# Patient Record
Sex: FEMALE | Race: WHITE | NOT HISPANIC OR LATINO | Employment: FULL TIME | URBAN - NONMETROPOLITAN AREA
[De-identification: names, ages, dates, MRNs, and addresses within clinical notes are randomized per-mention and may not be internally consistent; named-entity substitution may affect disease eponyms.]

---
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
## 2022-02-18 ENCOUNTER — IMMUNIZATION (OUTPATIENT)
Dept: FAMILY MEDICINE | Facility: OTHER | Age: 22
End: 2022-02-18
Attending: FAMILY MEDICINE
Payer: COMMERCIAL

## 2022-02-18 PROCEDURE — 91305 COVID-19,PF,PFIZER (12+ YRS): CPT

## 2022-02-18 PROCEDURE — 0054A COVID-19,PF,PFIZER (12+ YRS): CPT

## 2022-05-09 ENCOUNTER — OFFICE VISIT (OUTPATIENT)
Dept: FAMILY MEDICINE | Facility: OTHER | Age: 22
End: 2022-05-09
Attending: FAMILY MEDICINE
Payer: COMMERCIAL

## 2022-05-09 ENCOUNTER — HOSPITAL ENCOUNTER (OUTPATIENT)
Dept: GENERAL RADIOLOGY | Facility: OTHER | Age: 22
Discharge: HOME | End: 2022-05-09
Attending: FAMILY MEDICINE
Payer: COMMERCIAL

## 2022-05-09 VITALS
TEMPERATURE: 98.6 F | WEIGHT: 140.8 LBS | DIASTOLIC BLOOD PRESSURE: 68 MMHG | HEART RATE: 72 BPM | SYSTOLIC BLOOD PRESSURE: 126 MMHG | RESPIRATION RATE: 16 BRPM | OXYGEN SATURATION: 98 % | BODY MASS INDEX: 22.73 KG/M2

## 2022-05-09 DIAGNOSIS — R10.84 ABDOMINAL PAIN, GENERALIZED: ICD-10-CM

## 2022-05-09 DIAGNOSIS — R10.84 ABDOMINAL PAIN, GENERALIZED: Primary | ICD-10-CM

## 2022-05-09 DIAGNOSIS — R10.2 PELVIC PAIN IN FEMALE: ICD-10-CM

## 2022-05-09 DIAGNOSIS — Z01.812 PRE-PROCEDURE LAB EXAM: ICD-10-CM

## 2022-05-09 LAB
C TRACH DNA SPEC QL PROBE+SIG AMP: NEGATIVE
HCG UR QL: NEGATIVE
N GONORRHOEA DNA SPEC QL NAA+PROBE: NEGATIVE

## 2022-05-09 PROCEDURE — 87491 CHLMYD TRACH DNA AMP PROBE: CPT | Mod: ZL | Performed by: FAMILY MEDICINE

## 2022-05-09 PROCEDURE — 99213 OFFICE O/P EST LOW 20 MIN: CPT | Performed by: FAMILY MEDICINE

## 2022-05-09 PROCEDURE — 81025 URINE PREGNANCY TEST: CPT | Mod: ZL | Performed by: FAMILY MEDICINE

## 2022-05-09 PROCEDURE — 74019 RADEX ABDOMEN 2 VIEWS: CPT

## 2022-05-09 ASSESSMENT — PATIENT HEALTH QUESTIONNAIRE - PHQ9
10. IF YOU CHECKED OFF ANY PROBLEMS, HOW DIFFICULT HAVE THESE PROBLEMS MADE IT FOR YOU TO DO YOUR WORK, TAKE CARE OF THINGS AT HOME, OR GET ALONG WITH OTHER PEOPLE: SOMEWHAT DIFFICULT
SUM OF ALL RESPONSES TO PHQ QUESTIONS 1-9: 15
SUM OF ALL RESPONSES TO PHQ QUESTIONS 1-9: 15

## 2022-05-09 ASSESSMENT — PAIN SCALES - GENERAL: PAINLEVEL: MODERATE PAIN (5)

## 2022-05-09 ASSESSMENT — ENCOUNTER SYMPTOMS: ABDOMINAL PAIN: 1

## 2022-05-09 NOTE — PROGRESS NOTES
Assessment & Plan       ICD-10-CM    1. Abdominal pain, generalized  R10.84 US Abdomen Complete     US Pelvic Complete with Transvaginal     XR Abdomen 2 Views   2. Pelvic pain in female  R10.2 US Abdomen Complete     US Pelvic Complete with Transvaginal     XR Abdomen 2 Views   3. Pre-procedure lab exam  Z01.812 Pregnancy, Urine (HCG)     CANCELED: Pregnancy, Urine (HCG)     CANCELED: Pregnancy, Urine (HCG)       1.  Differential diagnosis of her symptoms are discussed with her.  This could include dysmenorrhea, endometriosis, IBS, IBD, chronic constipation, ovarian cyst.  We will proceed with this time with obtaining x-ray, mainly to look at bowel gas pattern.  We will then have her proceed with ultrasound abdomen and pelvis.  The indication for each of these exams was discussed with her and she is in agreement with this upcoming plan.  Additional follow-up will be pending results.    [id number]}    ALIYA VALDEZ MD  [facility]    Porfirio Chavez is a 21 year old who presents for the following health issues  - digestive concerns.    For several months she's had abdomen tenderness.    She will feel pressure internally, and any external pressure makes it worse.  Eating makes her feel bloated, regardless of what it is.  Periods - cramping becoming more severe.  Went to the ER for more severe pain - this was in November.  Ever since then her symptoms have been present.  No vomiting.  BMs regular, firm  - more madina than not.   No UTI type symptoms.    Sexually active - has used [employer] 4 times in the past 5 months.   In this relationship for 5 years.      Abdominal Pain     History of Present Illness       Mental Health Follow-up:                    Today's PHQ-9         PHQ-9 Total Score: 15  PHQ-9 Q9 Thoughts of better off dead/self-harm past 2 weeks :   (P) Not at all    How difficult have these problems made it for you to do your work, take care of things at home, or get  along with other people: Somewhat difficult        Reason for visit:  Stomach tenderness/ pain with pressure  Symptom onset:  More than a month  Symptoms include:  Cramping, discomfort, tenderness in my abdominal area  Symptom intensity:  Moderate  Symptom progression:  Worsening  Had these symptoms before:  No  What makes it worse:  Eating, laying on my stoamch, having any sort of pressure  What makes it better:  Keeping any and all pressure away from my stomach and i lay flat in bed    She eats 2-3 servings of fruits and vegetables daily.She consumes 3 sweetened beverage(s) daily.She exercises with enough effort to increase her heart rate 10 to 19 minutes per day.  She exercises with enough effort to increase her heart rate 5 days per week.   She is taking medications regularly.         Current Outpatient Medications   Medication     ibuprofen (ADVIL/MOTRIN) 200 MG tablet     No current facility-administered medications for this visit.      No Known Allergies  Past Medical History:   Diagnosis Date     Acne vulgaris      Major depression 2020     Malformation of urachus     2008         Review of Systems   Gastrointestinal: Positive for abdominal pain.            Objective    /68   Pulse 72   Temp 98.6  F (37  C) (Tympanic)   Resp 16   Wt 63.9 kg (140 lb 12.8 oz)   LMP 05/03/2022   SpO2 98%   Breastfeeding No   BMI 22.73 kg/m    Body mass index is 22.73 kg/m .  Physical Exam   GENERAL: healthy, alert and no distress  ABDOMEN: non-distended, mild lower abdomen tenderness, no rebound or guarding.

## 2022-05-09 NOTE — NURSING NOTE
"Chief Complaint   Patient presents with     Abdominal Pain     Patient is here for abdominal pain that is worsening. She is no longer on birth control.     Initial /68   Pulse 72   Temp 98.6  F (37  C) (Tympanic)   Resp 16   Wt 63.9 kg (140 lb 12.8 oz)   LMP 05/03/2022   SpO2 98%   Breastfeeding No   BMI 22.73 kg/m   Estimated body mass index is 22.73 kg/m  as calculated from the following:    Height as of 11/2/21: 1.676 m (5' 6\").    Weight as of this encounter: 63.9 kg (140 lb 12.8 oz).  Medication Reconciliation: complete    Tana Medina MA       FOOD SECURITY SCREENING QUESTIONS:    The next two questions are to help us understand your food security.  If you are feeling you need any assistance in this area, we have resources available to support you today.    Hunger Vital Signs:  Within the past 12 months we worried whether our food would run out before we got money to buy more. Never  Within the past 12 months the food we bought just didn't last and we didn't have money to get more. Never  Tana Medina MA,LPN on 5/9/2022 at 11:57 AM      "

## 2022-05-10 ENCOUNTER — HOSPITAL ENCOUNTER (OUTPATIENT)
Dept: ULTRASOUND IMAGING | Facility: OTHER | Age: 22
Discharge: HOME | End: 2022-05-10
Attending: FAMILY MEDICINE
Payer: COMMERCIAL

## 2022-05-10 DIAGNOSIS — R10.84 ABDOMINAL PAIN, GENERALIZED: ICD-10-CM

## 2022-05-10 DIAGNOSIS — R10.2 PELVIC PAIN IN FEMALE: ICD-10-CM

## 2022-05-10 PROCEDURE — 76700 US EXAM ABDOM COMPLETE: CPT

## 2022-05-10 PROCEDURE — 76856 US EXAM PELVIC COMPLETE: CPT

## 2022-05-10 ASSESSMENT — PATIENT HEALTH QUESTIONNAIRE - PHQ9: SUM OF ALL RESPONSES TO PHQ QUESTIONS 1-9: 15

## 2022-05-20 ENCOUNTER — LAB (OUTPATIENT)
Dept: LAB | Facility: OTHER | Age: 22
End: 2022-05-20
Payer: COMMERCIAL

## 2022-05-20 ENCOUNTER — LAB REQUISITION (OUTPATIENT)
Dept: LAB | Facility: OTHER | Age: 22
End: 2022-05-20

## 2022-05-20 DIAGNOSIS — Z02.1 ENCOUNTER FOR PRE-EMPLOYMENT EXAMINATION: ICD-10-CM

## 2022-05-20 PROCEDURE — 86481 TB AG RESPONSE T-CELL SUSP: CPT | Mod: ZL

## 2022-05-22 LAB
GAMMA INTERFERON BACKGROUND BLD IA-ACNC: 0.01 IU/ML
M TB IFN-G BLD-IMP: NEGATIVE
M TB IFN-G CD4+ BCKGRND COR BLD-ACNC: 9.99 IU/ML
MITOGEN IGNF BCKGRD COR BLD-ACNC: 0 IU/ML
MITOGEN IGNF BCKGRD COR BLD-ACNC: 0.01 IU/ML
QUANTIFERON MITOGEN: 10 IU/ML
QUANTIFERON NIL TUBE: 0.01 IU/ML
QUANTIFERON TB1 TUBE: 0.02 IU/ML
QUANTIFERON TB2 TUBE: 0.01

## 2022-06-02 ENCOUNTER — MYC MEDICAL ADVICE (OUTPATIENT)
Dept: FAMILY MEDICINE | Facility: OTHER | Age: 22
End: 2022-06-02
Payer: COMMERCIAL

## 2022-06-06 ENCOUNTER — OFFICE VISIT (OUTPATIENT)
Dept: FAMILY MEDICINE | Facility: OTHER | Age: 22
End: 2022-06-06
Attending: NURSE PRACTITIONER
Payer: COMMERCIAL

## 2022-06-06 VITALS
SYSTOLIC BLOOD PRESSURE: 124 MMHG | HEART RATE: 78 BPM | BODY MASS INDEX: 22.76 KG/M2 | OXYGEN SATURATION: 97 % | RESPIRATION RATE: 18 BRPM | TEMPERATURE: 98.8 F | WEIGHT: 141 LBS | DIASTOLIC BLOOD PRESSURE: 66 MMHG

## 2022-06-06 DIAGNOSIS — B37.41 YEAST CYSTITIS: ICD-10-CM

## 2022-06-06 DIAGNOSIS — N30.01 ACUTE CYSTITIS WITH HEMATURIA: Primary | ICD-10-CM

## 2022-06-06 DIAGNOSIS — R39.9 UTI SYMPTOMS: ICD-10-CM

## 2022-06-06 LAB
ALBUMIN UR-MCNC: 20 MG/DL
APPEARANCE UR: ABNORMAL
BACTERIA #/AREA URNS HPF: ABNORMAL /HPF
BILIRUB UR QL STRIP: NEGATIVE
COLOR UR AUTO: YELLOW
GLUCOSE UR STRIP-MCNC: NEGATIVE MG/DL
HGB UR QL STRIP: ABNORMAL
KETONES UR STRIP-MCNC: NEGATIVE MG/DL
LEUKOCYTE ESTERASE UR QL STRIP: ABNORMAL
NITRATE UR QL: NEGATIVE
PH UR STRIP: 7 [PH] (ref 5–9)
RBC URINE: 64 /HPF
SP GR UR STRIP: 1.01 (ref 1–1.03)
SQUAMOUS EPITHELIAL: 3 /HPF
UROBILINOGEN UR STRIP-MCNC: NORMAL MG/DL
WBC CLUMPS #/AREA URNS HPF: PRESENT /HPF
WBC URINE: >182 /HPF
YEAST #/AREA URNS HPF: ABNORMAL /HPF

## 2022-06-06 PROCEDURE — 81001 URINALYSIS AUTO W/SCOPE: CPT | Mod: ZL | Performed by: NURSE PRACTITIONER

## 2022-06-06 PROCEDURE — 87186 SC STD MICRODIL/AGAR DIL: CPT | Mod: ZL | Performed by: NURSE PRACTITIONER

## 2022-06-06 PROCEDURE — 99214 OFFICE O/P EST MOD 30 MIN: CPT | Performed by: NURSE PRACTITIONER

## 2022-06-06 RX ORDER — FLUCONAZOLE 200 MG/1
200 TABLET ORAL DAILY
Qty: 10 TABLET | Refills: 0 | Status: SHIPPED | OUTPATIENT
Start: 2022-06-06 | End: 2022-06-16

## 2022-06-06 RX ORDER — NITROFURANTOIN 25; 75 MG/1; MG/1
100 CAPSULE ORAL 2 TIMES DAILY
Qty: 10 CAPSULE | Refills: 0 | Status: SHIPPED | OUTPATIENT
Start: 2022-06-06 | End: 2022-06-11

## 2022-06-06 ASSESSMENT — PAIN SCALES - GENERAL: PAINLEVEL: SEVERE PAIN (6)

## 2022-06-06 NOTE — PROGRESS NOTES
ASSESSMENT/PLAN:     I have reviewed the nursing notes.  I have reviewed the findings, diagnosis, plan and need for follow up with the patient.      1. UTI symptoms    - UA reflex to Microscopic and Culture  - Urine Culture    2. Acute cystitis with hematuria    - nitroFURantoin macrocrystal-monohydrate (MACROBID) 100 MG capsule; Take 1 capsule (100 mg) by mouth 2 times daily for 5 days  Dispense: 10 capsule; Refill: 0    Urinalysis - yellow, slightly cloudy, large blood, protein 20, negative nitrite, large leukocyte estrace  Urine microscopic - RBC 64, WBC >182 with clumps, bacteria few, budding yeast few    Urine culture pending  Will call if culture warrants change of abx.     May use Pyridium OTC PRN.     Encouraged fluids and frequent bladder emptying.    3. Yeast cystitis    - fluconazole (DIFLUCAN) 200 MG tablet; Take 1 tablet (200 mg) by mouth daily for 10 days  Dispense: 10 tablet; Refill: 0    Discussed warning signs/symptoms indicative of need to f/u  Follow up if symptoms persist or worsen or concerns          I explained my diagnostic considerations and recommendations to the patient, who voiced understanding and agreement with the treatment plan. All questions were answered. We discussed potential side effects of any prescribed or recommended therapies, as well as expectations for response to treatments.    Aby Kyle NP  [facility]      SUBJECTIVE:   Kathy Hays is a 22 year old female who presents to clinic today for the following health issues:  Possible UTI    HPI  Symptoms for the past 4 to 5 days including dysuria, frequency, urgency, cloudy urine, urine odor and post void discomfort.  Scant blood in urine just noted in clinic.  No fevers or chills.  No abdominal pain.  No nausea or vomiting.  Appetite at baseline.  No diarrhea.  Constipation for the past few months seen her PCP, taking Miralax daily.  No back pain.  No vaginal itching, discharge or bleeding.  No  pregnancy concerns.  LMP 5/15/-518.  No birth control.  Condom use.  No STD concerns, same partner.            Past Medical History:   Diagnosis Date     Acne vulgaris      Major depression      Malformation of urachus          Past Surgical History:   Procedure Laterality Date     INCISION AND DRAINAGE  2007    INCISION AND DRAINAGE,Excision of a urachus     TONSILLECTOMY, ADENOIDECTOMY, COMBINED           Social History     Tobacco Use     Smoking status: Former Smoker     Types: Vaping Device     Quit date: 2019     Years since quittin.8     Smokeless tobacco: Never Used     Tobacco comment: vaping   Substance Use Topics     Alcohol use: No     No current outpatient medications on file.     No Known Allergies      Past medical history, past surgical history, current medications and allergies reviewed and accurate to the best of my knowledge.        OBJECTIVE:     /66   Pulse 78   Temp 98.8  F (37.1  C) (Tympanic)   Resp 18   Wt 64 kg (141 lb)   LMP 2022   SpO2 97%   BMI 22.76 kg/m    Body mass index is 22.76 kg/m .     Physical Exam  General Appearance: Well appearing adolescent female, appropriate appearance for age. No acute distress  Respiratory: normal chest wall and respirations.  Normal effort.  Clear to auscultation bilaterally, no wheezing, crackles or rhonchi.  No increased work of breathing.  No cough appreciated.  Cardiac: RRR with no murmurs  Abdomen: soft, nontender, no rigidity, no rebound tenderness or guarding, normal bowel sounds present  :  No suprapubic tenderness to palpation.  No CVA tenderness to palpation.    Musculoskeletal:  Equal movement of bilateral upper extremities.  Equal movement of bilateral lower extremities.  Normal gait.   Psychological: normal affect, alert, oriented, and pleasant.       Labs:  Results for orders placed or performed in visit on 22   UA reflex to Microscopic and Culture     Status: Abnormal    Specimen:  Urine, Midstream   Result Value Ref Range    Color Urine Yellow Colorless, Straw, Light Yellow, Yellow    Appearance Urine Slightly Cloudy (A) Clear    Glucose Urine Negative Negative mg/dL    Bilirubin Urine Negative Negative    Ketones Urine Negative Negative mg/dL    Specific Gravity Urine 1.007 1.000 - 1.030    Blood Urine Large (A) Negative    pH Urine 7.0 5.0 - 9.0    Protein Albumin Urine 20  (A) Negative mg/dL    Urobilinogen Urine Normal Normal, 2.0 mg/dL    Nitrite Urine Negative Negative    Leukocyte Esterase Urine Large (A) Negative    Bacteria Urine Few (A) None Seen /HPF    WBC Clumps Urine Present (A) None Seen /HPF    Budding Yeast Urine Few (A) None Seen /HPF    RBC Urine 64 (H) <=2 /HPF    WBC Urine >182 (H) <=5 /HPF    Squamous Epithelials Urine 3 (H) <=1 /HPF    Narrative    Urine Culture ordered based on laboratory criteria

## 2022-06-06 NOTE — NURSING NOTE
"Chief Complaint   Patient presents with     Urinary Problem     Patient is here for cloudy urine with an odor to it and burning upon urination that started 4-5 days ago and worsened today.    Initial /66   Pulse 78   Temp 98.8  F (37.1  C) (Tympanic)   Resp 18   Wt 64 kg (141 lb)   LMP 05/03/2022   SpO2 97%   BMI 22.76 kg/m   Estimated body mass index is 22.76 kg/m  as calculated from the following:    Height as of 11/2/21: 1.676 m (5' 6\").    Weight as of this encounter: 64 kg (141 lb).  Medication Reconciliation: complete    Hannah Ivan LPN  "

## 2022-06-08 LAB — BACTERIA UR CULT: ABNORMAL

## 2022-11-12 ENCOUNTER — APPOINTMENT (OUTPATIENT)
Dept: ULTRASOUND IMAGING | Facility: OTHER | Age: 22
End: 2022-11-12
Attending: FAMILY MEDICINE
Payer: COMMERCIAL

## 2022-11-12 ENCOUNTER — HOSPITAL ENCOUNTER (EMERGENCY)
Facility: OTHER | Age: 22
Discharge: HOME | End: 2022-11-12
Attending: FAMILY MEDICINE | Admitting: FAMILY MEDICINE
Payer: COMMERCIAL

## 2022-11-12 VITALS
RESPIRATION RATE: 18 BRPM | OXYGEN SATURATION: 97 % | HEART RATE: 71 BPM | DIASTOLIC BLOOD PRESSURE: 65 MMHG | TEMPERATURE: 98.1 F | SYSTOLIC BLOOD PRESSURE: 110 MMHG

## 2022-11-12 DIAGNOSIS — R18.8 FREE FLUID IN PELVIS: ICD-10-CM

## 2022-11-12 DIAGNOSIS — N83.202 RUPTURED CYST OF LEFT OVARY: ICD-10-CM

## 2022-11-12 DIAGNOSIS — R10.2 PELVIC PAIN: ICD-10-CM

## 2022-11-12 LAB
ALBUMIN UR-MCNC: 30 MG/DL
ANION GAP SERPL CALCULATED.3IONS-SCNC: 10 MMOL/L (ref 7–15)
APPEARANCE UR: ABNORMAL
BASOPHILS # BLD AUTO: 0.1 10E3/UL (ref 0–0.2)
BASOPHILS NFR BLD AUTO: 1 %
BILIRUB UR QL STRIP: NEGATIVE
BUN SERPL-MCNC: 7 MG/DL (ref 6–20)
CALCIUM SERPL-MCNC: 9.1 MG/DL (ref 8.6–10)
CHLORIDE SERPL-SCNC: 104 MMOL/L (ref 98–107)
COLOR UR AUTO: YELLOW
CREAT SERPL-MCNC: 0.71 MG/DL (ref 0.51–0.95)
DEPRECATED HCO3 PLAS-SCNC: 24 MMOL/L (ref 22–29)
EOSINOPHIL # BLD AUTO: 0.1 10E3/UL (ref 0–0.7)
EOSINOPHIL NFR BLD AUTO: 1 %
ERYTHROCYTE [DISTWIDTH] IN BLOOD BY AUTOMATED COUNT: 12.5 % (ref 10–15)
GFR SERPL CREATININE-BSD FRML MDRD: >90 ML/MIN/1.73M2
GLUCOSE SERPL-MCNC: 98 MG/DL (ref 70–99)
GLUCOSE UR STRIP-MCNC: NEGATIVE MG/DL
HCG UR QL: NEGATIVE
HCT VFR BLD AUTO: 37.9 % (ref 35–47)
HGB BLD-MCNC: 13.1 G/DL (ref 11.7–15.7)
HGB UR QL STRIP: NEGATIVE
HOLD SPECIMEN: NORMAL
IMM GRANULOCYTES # BLD: 0 10E3/UL
IMM GRANULOCYTES NFR BLD: 0 %
KETONES UR STRIP-MCNC: ABNORMAL MG/DL
LEUKOCYTE ESTERASE UR QL STRIP: NEGATIVE
LYMPHOCYTES # BLD AUTO: 2.4 10E3/UL (ref 0.8–5.3)
LYMPHOCYTES NFR BLD AUTO: 26 %
MCH RBC QN AUTO: 30.5 PG (ref 26.5–33)
MCHC RBC AUTO-ENTMCNC: 34.6 G/DL (ref 31.5–36.5)
MCV RBC AUTO: 88 FL (ref 78–100)
MONOCYTES # BLD AUTO: 0.8 10E3/UL (ref 0–1.3)
MONOCYTES NFR BLD AUTO: 8 %
MUCOUS THREADS #/AREA URNS LPF: PRESENT /LPF
NEUTROPHILS # BLD AUTO: 5.8 10E3/UL (ref 1.6–8.3)
NEUTROPHILS NFR BLD AUTO: 64 %
NITRATE UR QL: NEGATIVE
NRBC # BLD AUTO: 0 10E3/UL
NRBC BLD AUTO-RTO: 0 /100
PH UR STRIP: 8 [PH] (ref 5–9)
PLATELET # BLD AUTO: 270 10E3/UL (ref 150–450)
POTASSIUM SERPL-SCNC: 3.9 MMOL/L (ref 3.4–5.3)
RBC # BLD AUTO: 4.3 10E6/UL (ref 3.8–5.2)
RBC URINE: 1 /HPF
SODIUM SERPL-SCNC: 138 MMOL/L (ref 136–145)
SP GR UR STRIP: 1.03 (ref 1–1.03)
SQUAMOUS EPITHELIAL: 1 /HPF
UROBILINOGEN UR STRIP-MCNC: 4 MG/DL
WBC # BLD AUTO: 9.2 10E3/UL (ref 4–11)
WBC URINE: 1 /HPF

## 2022-11-12 PROCEDURE — 80048 BASIC METABOLIC PNL TOTAL CA: CPT | Performed by: FAMILY MEDICINE

## 2022-11-12 PROCEDURE — 85025 COMPLETE CBC W/AUTO DIFF WBC: CPT | Performed by: FAMILY MEDICINE

## 2022-11-12 PROCEDURE — 81025 URINE PREGNANCY TEST: CPT | Performed by: FAMILY MEDICINE

## 2022-11-12 PROCEDURE — 96372 THER/PROPH/DIAG INJ SC/IM: CPT | Performed by: FAMILY MEDICINE

## 2022-11-12 PROCEDURE — 99285 EMERGENCY DEPT VISIT HI MDM: CPT | Mod: 25 | Performed by: FAMILY MEDICINE

## 2022-11-12 PROCEDURE — 81001 URINALYSIS AUTO W/SCOPE: CPT | Performed by: FAMILY MEDICINE

## 2022-11-12 PROCEDURE — 250N000011 HC RX IP 250 OP 636: Performed by: FAMILY MEDICINE

## 2022-11-12 PROCEDURE — 76856 US EXAM PELVIC COMPLETE: CPT | Mod: TC

## 2022-11-12 PROCEDURE — 36415 COLL VENOUS BLD VENIPUNCTURE: CPT | Performed by: FAMILY MEDICINE

## 2022-11-12 PROCEDURE — 99283 EMERGENCY DEPT VISIT LOW MDM: CPT | Performed by: FAMILY MEDICINE

## 2022-11-12 RX ORDER — KETOROLAC TROMETHAMINE 30 MG/ML
30 INJECTION, SOLUTION INTRAMUSCULAR; INTRAVENOUS ONCE
Status: COMPLETED | OUTPATIENT
Start: 2022-11-12 | End: 2022-11-12

## 2022-11-12 RX ADMIN — KETOROLAC TROMETHAMINE 30 MG: 30 INJECTION, SOLUTION INTRAMUSCULAR at 01:11

## 2022-11-12 ASSESSMENT — ENCOUNTER SYMPTOMS
NAUSEA: 0
VOMITING: 0
FEVER: 0

## 2022-11-12 ASSESSMENT — ACTIVITIES OF DAILY LIVING (ADL): ADLS_ACUITY_SCORE: 35

## 2022-11-12 NOTE — ED TRIAGE NOTES
Pt to ER with SO from home.  Following intercourse on Thursday pt experienced abdominal cramping, more in LLQ than rest.  Following day continued to have constant abdominal cramps, however also experienced rectal cramping severely for approx 2 hour.  Rectal cramping since subsided, only pain when tries to have BM.  Pain at this time diffuse 7/10 t/o abdomen, though centers most on LLQ. Denies abnormal vaginal drainage.     Triage Assessment       Row Name 11/12/22 0056       Triage Assessment (Adult)    Airway WDL WDL       Respiratory WDL    Respiratory WDL WDL       Skin Circulation/Temperature WDL    Skin Circulation/Temperature WDL WDL       Cardiac WDL    Cardiac WDL WDL       Peripheral/Neurovascular WDL    Peripheral Neurovascular WDL WDL       Cognitive/Neuro/Behavioral WDL    Cognitive/Neuro/Behavioral WDL WDL

## 2022-11-12 NOTE — ED PROVIDER NOTES
History     Chief Complaint   Patient presents with     Abdominal Pain     Rectal/perineal Pain     The history is provided by the patient.     Kathy Hays is a 22 year old female here with pelvic pain and rectal pain. She first had pain during intercourse on Thursday morning before going to work. She had LLQ pain and cramping. She was able to work all day but in the evening she had rectal cramps. She took a hot shower and tried some ibuprofen. The cramping pain has been persistent and today (Saturday 1:10 AM) she still has crampy pain. She did take some Tylenol this evening.  Her LMP was Oct 20 and normal for her. She is not using protection against pregnancy. No known history of symptomatic ovarian cysts.     Allergies:  No Known Allergies    Problem List:    Patient Active Problem List    Diagnosis Date Noted     Acne vulgaris      Priority: Medium     Nevus, non-neoplastic 08/27/2008     Priority: Medium     Overview:   Congenital nevus angle of left jaw, is on carotid artery, so technically difficult to remove. Signed by Lisa Winter MD .....8/16/2016 2:23 PM          Past Medical History:    Past Medical History:   Diagnosis Date     Acne vulgaris      Major depression 2020     Malformation of urachus        Past Surgical History:    Past Surgical History:   Procedure Laterality Date     INCISION AND DRAINAGE  01/04/2007    INCISION AND DRAINAGE,Excision of a urachus     TONSILLECTOMY, ADENOIDECTOMY, COMBINED      2007       Family History:    Family History   Problem Relation Age of Onset     Depression Mother      Thyroid Disease Father      Cancer Maternal Grandfather         Cancer,throat       Social History:  Marital Status:  Single [1]  Social History     Tobacco Use     Smoking status: Former     Types: Vaping Device     Quit date: 8/11/2019     Years since quitting: 3.2     Smokeless tobacco: Never     Tobacco comments:     vaping   Vaping Use     Vaping Use: Every day   Substance Use Topics      Alcohol use: No     Drug use: Never        Medications:    No current outpatient medications on file.    Review of Systems   Constitutional: Negative for fever.   Gastrointestinal: Negative for nausea and vomiting.   Genitourinary: Positive for pelvic pain.   All other systems reviewed and are negative.      Physical Exam   BP: 136/75  Pulse: 79  Temp: 98.1  F (36.7  C)  Resp: 18  SpO2: 97 %      Physical Exam  Vitals and nursing note reviewed.   Constitutional:       General: She is not in acute distress.     Appearance: She is well-developed. She is not ill-appearing, toxic-appearing or diaphoretic.   Cardiovascular:      Rate and Rhythm: Normal rate and regular rhythm.      Heart sounds: Normal heart sounds.   Pulmonary:      Effort: Pulmonary effort is normal. No respiratory distress.   Abdominal:      General: Abdomen is flat. Bowel sounds are normal.      Palpations: Abdomen is soft.      Tenderness: There is abdominal tenderness in the suprapubic area and left lower quadrant. There is no guarding or rebound.   Skin:     General: Skin is warm and dry.      Coloration: Skin is not mottled or pale.      Findings: No erythema or rash.   Neurological:      Mental Status: She is alert.         Results for orders placed or performed during the hospital encounter of 11/12/22 (from the past 24 hour(s))   UA reflex to Microscopic and Culture    Specimen: Urine, Clean Catch   Result Value Ref Range    Color Urine Yellow Colorless, Straw, Light Yellow, Yellow    Appearance Urine Slightly Cloudy (A) Clear    Glucose Urine Negative Negative mg/dL    Bilirubin Urine Negative Negative    Ketones Urine Trace (A) Negative mg/dL    Specific Gravity Urine 1.032 (H) 1.000 - 1.030    Blood Urine Negative Negative    pH Urine 8.0 5.0 - 9.0    Protein Albumin Urine 30 (A) Negative mg/dL    Urobilinogen Urine 4.0 (A) Normal, 2.0 mg/dL    Nitrite Urine Negative Negative    Leukocyte Esterase Urine Negative Negative    Mucus Urine  Present (A) None Seen /LPF    RBC Urine 1 <=2 /HPF    WBC Urine 1 <=5 /HPF    Squamous Epithelials Urine 1 <=1 /HPF    Narrative    Urine Culture not indicated   HCG qualitative urine   Result Value Ref Range    hCG Urine Qualitative Negative Negative   CBC with platelets differential    Narrative    The following orders were created for panel order CBC with platelets differential.  Procedure                               Abnormality         Status                     ---------                               -----------         ------                     CBC with platelets and d...[[id number]]                      Final result                 Please view results for these tests on the individual orders.   Basic metabolic panel   Result Value Ref Range    Sodium 138 136 - 145 mmol/L    Potassium 3.9 3.4 - 5.3 mmol/L    Chloride 104 98 - 107 mmol/L    Carbon Dioxide (CO2) 24 22 - 29 mmol/L    Anion Gap 10 7 - 15 mmol/L    Urea Nitrogen 7.0 6.0 - 20.0 mg/dL    Creatinine 0.71 0.51 - 0.95 mg/dL    Calcium 9.1 8.6 - 10.0 mg/dL    Glucose 98 70 - 99 mg/dL    GFR Estimate >90 >60 mL/min/1.73m2   CBC with platelets and differential   Result Value Ref Range    WBC Count 9.2 4.0 - 11.0 10e3/uL    RBC Count 4.30 3.80 - 5.20 10e6/uL    Hemoglobin 13.1 11.7 - 15.7 g/dL    Hematocrit 37.9 35.0 - 47.0 %    MCV 88 78 - 100 fL    MCH 30.5 26.5 - 33.0 pg    MCHC 34.6 31.5 - 36.5 g/dL    RDW 12.5 10.0 - 15.0 %    Platelet Count 270 150 - 450 10e3/uL    % Neutrophils 64 %    % Lymphocytes 26 %    % Monocytes 8 %    % Eosinophils 1 %    % Basophils 1 %    % Immature Granulocytes 0 %    NRBCs per 100 WBC 0 <1 /100    Absolute Neutrophils 5.8 1.6 - 8.3 10e3/uL    Absolute Lymphocytes 2.4 0.8 - 5.3 10e3/uL    Absolute Monocytes 0.8 0.0 - 1.3 10e3/uL    Absolute Eosinophils 0.1 0.0 - 0.7 10e3/uL    Absolute Basophils 0.1 0.0 - 0.2 10e3/uL    Absolute Immature Granulocytes 0.0 <=0.4 10e3/uL    Absolute NRBCs 0.0 10e3/uL   Extra Tube     Narrative    The following orders were created for panel order Extra Tube.  Procedure                               Abnormality         Status                     ---------                               -----------         ------                     Extra Blue Top Tube[[id number]]                              Final result               Extra Serum Separator Tu...[[id number]]                      Final result               Extra Green Top (Lithium...[[id number]]                      Final result                 Please view results for these tests on the individual orders.   Extra Blue Top Tube   Result Value Ref Range    Hold Specimen JIC    Extra Serum Separator Tube (SST)   Result Value Ref Range    Hold Specimen JIC    Extra Green Top (Lithium Heparin) ON ICE   Result Value Ref Range    Hold Specimen JIC    US Pelvic Complete w Transvaginal    Narrative    PROCEDURE INFORMATION:   Exam: US Pelvis Complete, Transabdominal and US Pelvis, Transvaginal and US   Duplex Artery and Vein, Ovaries, Complete   Exam date and time: 11/12/2022 1:44 AM   Age: 22 years old   Clinical indication: Pelvic pain; Additional info: Pelvic pain, l>r     TECHNIQUE:   Imaging protocol: Real-time complete transabdominal and transvaginal pelvic   ultrasound with image documentation. Transvaginal imaging was used for better   evaluation of the endometrium, adnexa, and/or cervix. Real-time duplex   ultrasound scan of the arterial and venous flow of the ovaries with B-mode,   color Doppler flow and spectral waveform analysis. Duplex exam was performed to   evaluate for torsion and other vascular conditions.     COMPARISON:   No relevant prior studies available.     FINDINGS:   Uterus: Measures approximately 8.4 x 3.9 x 5.7 cm.   Right ovary/adnexa: Measures approximately 9.3 mL.   Left ovary/adnexa: Measures approximately 5.1 x 4.3 x 3.0 cm/34.5 mL. LEFT   ovarian hemorrhagic versus corpus luteal cyst/follicle measuring approximately   2.1 x 2.0  x 1.9 cm.   Intraperitoneal space: No discernible adnexal mass or abnormality. Small amount   free fluid within the pelvis.   Urinary bladder: Empty urinary bladder.     Endometrial stripe thickness: Endometrial thickness is approximately 12 mm.     Other findings: Doppler examination of the ovaries with pulsed wave and color   images was performed which demonstrate arterial/venous waveforms within normal   limits.       Impression    IMPRESSION:   1. Mildly enlarged LEFT ovary with hemorrhagic versus corpus luteal   cyst/follicle with both ovaries demonstrating blood flow on Doppler sonogram.   2. Small amount of physiologic free fluid in the pelvis likely due to a   ruptured ovarian cyst/follicle.     THIS DOCUMENT HAS BEEN ELECTRONICALLY SIGNED BY MARK URIOSTEGUI MD       Medications   ketorolac (TORADOL) injection 30 mg (30 mg Intramuscular Given 11/12/22 0111)       Assessments & Plan (with Medical Decision Making)  Kathy Hays is a 22 year old female here with pelvic pain and rectal pain. She first had pain during intercourse on Thursday morning before going to work. She had LLQ pain and cramping. She was able to work all day but in the evening she had rectal cramps. She took a hot shower and tried some ibuprofen. The cramping pain has been persistent and today (Saturday 1:10 AM) she still has crampy pain. She did take some Tylenol this evening.  Her LMP was Oct 20 and normal for her. She is not using protection against pregnancy. No known history of symptomatic ovarian cysts. VS in the ED /65   Pulse 71   Temp 98.1  F (36.7  C)   Resp 18   LMP 10/20/2022   SpO2 97%   Exam shows some LLQ tenderness but no guarding or rebound. The rest of her exam was normal. We talked about Toradol for pain and she would like an IM shot.  Labs show CBC normal, BMP normal, UA negative for UTI, UPT negative.  Ultrasound shows a rupture left ovarian cyst with some free fluid in the pelvis.  She has had this in the  past, most recently in Nov 2021, and she has an appointment with an OB next week. I think it would be a good idea to keep that appointment.      I have reviewed the nursing notes.    I have reviewed the findings, diagnosis, plan and need for follow up with the patient.    Final diagnoses:   Pelvic pain   Ruptured cyst of left ovary   Free fluid in pelvis       11/12/2022   [facility], Destin Parmar MD  11/12/22 2491

## 2022-11-15 NOTE — NURSING NOTE
"Chief Complaint   Patient presents with     Sinus Problem     Was sick ago 2 weeks ago. In the last 2-3 days she has had sinus pressure that has been causing headaches.     Initial LMP  (LMP Unknown)  Estimated body mass index is 22.46 kg/m  as calculated from the following:    Height as of 2/26/20: 1.657 m (5' 5.25\").    Weight as of 2/26/20: 61.7 kg (136 lb).    Medication Reconciliation: complete      Otis Jc LPN  " [facility] ambulatory encounter  URGENT CARE OFFICE VISIT    SUBJECTIVE:  Paty Kinney is a 34 year old female        Chief Complaint   Patient presents with   • Cough     Cough and congestion for the past 2 weeks.  Getting worse and now has some sinus pressure and a runny nose.  Reports productive cough with green phlegm.  Tried otc mucinex and nyquil which have not been effective       Paty presents today with sore throat, cough, congestion, and phlegm.  Started 2 weeks ago was improving now seems to be getting worse. Has been taking Mucinex and OTC cold medication with minimal relief.        -no fever, no chills,   -no sob, no chest pain  -no ear discharge  -no abd pain, no nausea, no vomiting, no diarrhea, no constipation, no bloody stools        ASSESSMENT AND PLAN  Bronchitis  (primary encounter diagnosis)  Plan: azithromycin (ZITHROMAX) 500 MG tablet      - Previous labs and results: gfr > 90.  - history was taken from: patient        The patient was advised to follow up with primary physician or to recheck with the urgent care clinic sooner if symptoms get worse or if new symptoms appear.    The patient indicated understanding of the diagnosis and agreed with the plan of care.    Please understand this is a provisional diagnosis that can and may change. The diagnosis that you are discharged with is based on the symptoms you described and the diagnostic information available today. If any new severe symptoms occur or existing symptoms worsen, you should seek evaluation at the nearest Emergency Department    ED precautions discussed with patient        Review of systems:    A review of systems was performed and findings relevant to these symptoms are included in the HPI.     PAST HISTORIES:    ALLERGIES:   Allergen Reactions   • Cefepime RASH       MEDICATIONS:  Current Outpatient Medications   Medication Sig   • azithromycin (ZITHROMAX) 500 MG tablet Take 1 tablet by mouth daily for 5 days.   • omeprazole  (PrilOSEC) 20 MG capsule Take 1 capsule by mouth daily.   • levonorgestrel-ethinyl estradiol (LESSINA-28) 0.1-20 MG-MCG per tablet Take 1 tablet by mouth daily.   • albuterol 108 (90 Base) MCG/ACT inhaler INHALE 2 PUFFS EVERY 4 HOURS AS NEEDED FOR SHORTNESS OF BREATH OR WHEEZING   • Multiple Vitamins-Minerals (MULTIVITAMIN GUMMIES ADULT) Chew Tab Chew by mouth daily.     No current facility-administered medications for this visit.       I have reviewed the past medical history, family history, social history, medications and allergies listed in the medical record as obtained by my nursing staff and support staff and agree with their documentation      OBJECTIVE:  Physical Exam:    Visit Vitals  /80   Pulse 86   Temp 98.3 °F (36.8 °C) (Oral)   Resp 16   Wt 90.7 kg (200 lb)   LMP 11/09/2022   SpO2 97%   BMI 31.32 kg/m²        CONSTITUTIONAL: Well-hydrated, well-nourished female who appears to be in no acute distress. Awake, alert and cooperative.  HEENT: TMs are clear bilaterally. External ears without deformities. Hearing is grossly normal. Nose without deformities. There is moist nasal mucosa. Throat is erythema with moist mucous membranes.   NECK: No lymphadenopathy (check) or thyroid enlargement. There is full range of motion. There is no tenderness noted.  LUNGS: Clear to auscultation bilaterally. No wheezes, rales or rhonchi noted.   CARDIOVASCULAR: Regular rate and rhythm with normal S1 and S2. There are no murmurs auscultated.   SKIN: Warm, dry, intact without rash or lesion.  NEUROLOGIC: Alert and oriented x3.

## 2022-12-01 NOTE — PROGRESS NOTES
Assessment & Plan     1. Dysmenorrhea  Chronic, progressing. Differential includes dysmenorrhea, endometriosis, ovarian cyst, PCOS, uterine polyp, uterine fibroid, infection, STD, abdominal cause, etc.  Vitals and exam stable today.  Recently had large evaluation including ultrasound, lab work, and urine in ED.  Results reviewed which showed possible ovarian cyst/recent cyst or follicle rupture.  Discussed options going forward including pelvic exam, rule out infection, management with hormonal contraception, referral to OB/GYN.  Patient declined pelvic exam today.  She is requesting to try oral contraception for management as she previously had done well with Ortho-Cyclen in the past. Prescription given as below.  Educated on medication, use, side effects.  Follow-up in 3 months with PCP or myself for repeat evaluation or sooner if needed.  Can consider repeat ultrasound to ensure resolution of ovarian cyst if symptoms persist.  - norgestimate-ethinyl estradiol (ORTHO-CYCLEN) 0.25-35 MG-MCG tablet; Take 1 tablet by mouth daily  Dispense: 84 tablet; Refill: 1    2. Dyspareunia in female  Chronic, stable. As above.  - norgestimate-ethinyl estradiol (ORTHO-CYCLEN) 0.25-35 MG-MCG tablet; Take 1 tablet by mouth daily  Dispense: 84 tablet; Refill: 1        Return if symptoms worsen or fail to improve.    Elma Rivero PA-C  [facility] is a 22 year old, presenting for the following health issues:  Abnormal Bleeding Problem      History of Present Illness       Reason for visit:  Concerncs with menstral Cycles and cysts    She eats 0-1 servings of fruits and vegetables daily.She consumes 4 sweetened beverage(s) daily.She exercises with enough effort to increase her heart rate 30 to 60 minutes per day.  She exercises with enough effort to increase her heart rate 5 days per week.   She is taking medications regularly.     Here for follow-up on menstrual concerns and  pelvic pain.  Patient reports that after she stopped her oral contraceptive pill a couple years ago she has been struggling with painful periods, chronic pelvic pain, pain with sexual intercourse.  She was evaluated by PCP regarding this and May where pelvic ultrasound was completed and was normal, STD screening negative.  PCP offered management for possible endometriosis but patient did not pursue at that time.  She subsequent was seen in the emergency department last month or updated pelvic ultrasound did show possible ovarian cyst/recent rupture of cyst or follicle, lab work and UA unremarkable.  Patient reports she continues to struggle with a sharper left lower quadrant pain, significantly painful periods, pain with sexual intercourse.  Her menstrual cycle is fairly regular.  Last menstrual period was 11/14/2022.  She is currently sexually active with 1 male partner, have been in monogamous relationship for many years.  No associated fever/chills, vaginal itching or burning, discharge, nausea, vomiting, diarrhea, constipation, blood in stool.    PAST MEDICAL HISTORY:   Past Medical History:   Diagnosis Date     Acne vulgaris      Major depression 2020     Malformation of urachus     2008       PAST SURGICAL HISTORY:   Past Surgical History:   Procedure Laterality Date     INCISION AND DRAINAGE  01/04/2007    INCISION AND DRAINAGE,Excision of a urachus     TONSILLECTOMY, ADENOIDECTOMY, COMBINED      2007       FAMILY HISTORY:   Family History   Problem Relation Age of Onset     Depression Mother      Thyroid Disease Father      Cancer Maternal Grandfather         Cancer,throat       SOCIAL HISTORY:   Social History     Tobacco Use     Smoking status: Never     Smokeless tobacco: Never     Tobacco comments:     vaping   Substance Use Topics     Alcohol use: No      No Known Allergies  Current Outpatient Medications   Medication     norgestimate-ethinyl estradiol (ORTHO-CYCLEN) 0.25-35 MG-MCG tablet     No current  "facility-administered medications for this visit.         Review of Systems   Per HPI        Objective    /64 (BP Location: Right arm, Patient Position: Sitting)   Pulse 63   Temp 97.4  F (36.3  C)   Ht 1.681 m (5' 6.2\")   Wt 68 kg (150 lb)   LMP 10/20/2022   SpO2 96%   BMI 24.06 kg/m    Body mass index is 24.06 kg/m .  Physical Exam   General: Pleasant, in no apparent distress.  Cardiovascular: Regular rate and rhythm with S1 equal to S2. No murmurs, friction rubs, or gallops.   Respiratory: Lungs are resonant and clear to auscultation bilaterally. No wheezes, crackles, or rhonchi.  Psych: Appropriate mood and affect.        "

## 2022-12-02 ENCOUNTER — OFFICE VISIT (OUTPATIENT)
Dept: FAMILY MEDICINE | Facility: OTHER | Age: 22
End: 2022-12-02
Attending: PHYSICIAN ASSISTANT
Payer: COMMERCIAL

## 2022-12-02 VITALS
SYSTOLIC BLOOD PRESSURE: 120 MMHG | WEIGHT: 150 LBS | DIASTOLIC BLOOD PRESSURE: 64 MMHG | HEIGHT: 66 IN | TEMPERATURE: 97.4 F | HEART RATE: 63 BPM | OXYGEN SATURATION: 96 % | BODY MASS INDEX: 24.11 KG/M2

## 2022-12-02 DIAGNOSIS — N94.10 DYSPAREUNIA IN FEMALE: ICD-10-CM

## 2022-12-02 DIAGNOSIS — N94.6 DYSMENORRHEA: Primary | ICD-10-CM

## 2022-12-02 PROCEDURE — 99214 OFFICE O/P EST MOD 30 MIN: CPT | Performed by: PHYSICIAN ASSISTANT

## 2022-12-02 RX ORDER — NORGESTIMATE AND ETHINYL ESTRADIOL 0.25-0.035
1 KIT ORAL DAILY
Qty: 84 TABLET | Refills: 1 | Status: SHIPPED | OUTPATIENT
Start: 2022-12-02 | End: 2023-05-05

## 2022-12-02 ASSESSMENT — PAIN SCALES - GENERAL: PAINLEVEL: NO PAIN (0)

## 2022-12-02 NOTE — NURSING NOTE
Last year or two menstrual symptoms are worse. Stabbing pain, major bloating, painful intercourse. Abnormal, inconsistant cycles.

## 2022-12-28 ENCOUNTER — HOSPITAL ENCOUNTER (EMERGENCY)
Facility: OTHER | Age: 22
Discharge: HOME | End: 2022-12-28
Attending: PHYSICIAN ASSISTANT | Admitting: PHYSICIAN ASSISTANT
Payer: COMMERCIAL

## 2022-12-28 VITALS
DIASTOLIC BLOOD PRESSURE: 78 MMHG | TEMPERATURE: 98.4 F | RESPIRATION RATE: 14 BRPM | WEIGHT: 155 LBS | BODY MASS INDEX: 24.87 KG/M2 | SYSTOLIC BLOOD PRESSURE: 120 MMHG | HEART RATE: 93 BPM | OXYGEN SATURATION: 100 %

## 2022-12-28 DIAGNOSIS — R11.10 VOMITING AND DIARRHEA: ICD-10-CM

## 2022-12-28 DIAGNOSIS — R19.7 VOMITING AND DIARRHEA: ICD-10-CM

## 2022-12-28 LAB
ALBUMIN SERPL BCG-MCNC: 4.7 G/DL (ref 3.5–5.2)
ALBUMIN UR-MCNC: 20 MG/DL
ALP SERPL-CCNC: 63 U/L (ref 35–104)
ALT SERPL W P-5'-P-CCNC: 11 U/L (ref 10–35)
ANION GAP SERPL CALCULATED.3IONS-SCNC: 11 MMOL/L (ref 7–15)
APPEARANCE UR: CLEAR
AST SERPL W P-5'-P-CCNC: 16 U/L (ref 10–35)
BASOPHILS # BLD AUTO: 0 10E3/UL (ref 0–0.2)
BASOPHILS NFR BLD AUTO: 0 %
BILIRUB SERPL-MCNC: 0.8 MG/DL
BILIRUB UR QL STRIP: NEGATIVE
BUN SERPL-MCNC: 12.8 MG/DL (ref 6–20)
CALCIUM SERPL-MCNC: 9.5 MG/DL (ref 8.6–10)
CHLORIDE SERPL-SCNC: 103 MMOL/L (ref 98–107)
COLOR UR AUTO: YELLOW
CREAT SERPL-MCNC: 0.54 MG/DL (ref 0.51–0.95)
DEPRECATED HCO3 PLAS-SCNC: 22 MMOL/L (ref 22–29)
EOSINOPHIL # BLD AUTO: 0 10E3/UL (ref 0–0.7)
EOSINOPHIL NFR BLD AUTO: 0 %
ERYTHROCYTE [DISTWIDTH] IN BLOOD BY AUTOMATED COUNT: 11.9 % (ref 10–15)
FLUAV RNA SPEC QL NAA+PROBE: NEGATIVE
FLUBV RNA RESP QL NAA+PROBE: NEGATIVE
GFR SERPL CREATININE-BSD FRML MDRD: >90 ML/MIN/1.73M2
GLUCOSE SERPL-MCNC: 107 MG/DL (ref 70–99)
GLUCOSE UR STRIP-MCNC: NEGATIVE MG/DL
HCG UR QL: NEGATIVE
HCT VFR BLD AUTO: 42.7 % (ref 35–47)
HGB BLD-MCNC: 15 G/DL (ref 11.7–15.7)
HGB UR QL STRIP: NEGATIVE
HOLD SPECIMEN: NORMAL
IMM GRANULOCYTES # BLD: 0 10E3/UL
IMM GRANULOCYTES NFR BLD: 0 %
KETONES UR STRIP-MCNC: 150 MG/DL
LEUKOCYTE ESTERASE UR QL STRIP: NEGATIVE
LYMPHOCYTES # BLD AUTO: 0.2 10E3/UL (ref 0.8–5.3)
LYMPHOCYTES NFR BLD AUTO: 1 %
MAGNESIUM SERPL-MCNC: 1.8 MG/DL (ref 1.7–2.3)
MCH RBC QN AUTO: 30.5 PG (ref 26.5–33)
MCHC RBC AUTO-ENTMCNC: 35.1 G/DL (ref 31.5–36.5)
MCV RBC AUTO: 87 FL (ref 78–100)
MONOCYTES # BLD AUTO: 0.4 10E3/UL (ref 0–1.3)
MONOCYTES NFR BLD AUTO: 3 %
MUCOUS THREADS #/AREA URNS LPF: PRESENT /LPF
NEUTROPHILS # BLD AUTO: 13.7 10E3/UL (ref 1.6–8.3)
NEUTROPHILS NFR BLD AUTO: 96 %
NITRATE UR QL: NEGATIVE
NRBC # BLD AUTO: 0 10E3/UL
NRBC BLD AUTO-RTO: 0 /100
PH UR STRIP: 6 [PH] (ref 5–9)
PLATELET # BLD AUTO: 290 10E3/UL (ref 150–450)
POTASSIUM SERPL-SCNC: 3.9 MMOL/L (ref 3.4–5.3)
PROT SERPL-MCNC: 8.2 G/DL (ref 6.4–8.3)
RBC # BLD AUTO: 4.91 10E6/UL (ref 3.8–5.2)
RBC URINE: 3 /HPF
RSV RNA SPEC NAA+PROBE: NEGATIVE
SARS-COV-2 RNA RESP QL NAA+PROBE: POSITIVE
SODIUM SERPL-SCNC: 136 MMOL/L (ref 136–145)
SP GR UR STRIP: 1.03 (ref 1–1.03)
SQUAMOUS EPITHELIAL: 1 /HPF
UROBILINOGEN UR STRIP-MCNC: NORMAL MG/DL
WBC # BLD AUTO: 14.4 10E3/UL (ref 4–11)
WBC URINE: 1 /HPF

## 2022-12-28 PROCEDURE — 258N000003 HC RX IP 258 OP 636: Performed by: PHYSICIAN ASSISTANT

## 2022-12-28 PROCEDURE — 83735 ASSAY OF MAGNESIUM: CPT | Performed by: PHYSICIAN ASSISTANT

## 2022-12-28 PROCEDURE — 81025 URINE PREGNANCY TEST: CPT | Performed by: PHYSICIAN ASSISTANT

## 2022-12-28 PROCEDURE — 99284 EMERGENCY DEPT VISIT MOD MDM: CPT | Mod: 25,CS | Performed by: PHYSICIAN ASSISTANT

## 2022-12-28 PROCEDURE — 96361 HYDRATE IV INFUSION ADD-ON: CPT | Performed by: PHYSICIAN ASSISTANT

## 2022-12-28 PROCEDURE — 87637 SARSCOV2&INF A&B&RSV AMP PRB: CPT | Performed by: PHYSICIAN ASSISTANT

## 2022-12-28 PROCEDURE — 85014 HEMATOCRIT: CPT | Performed by: PHYSICIAN ASSISTANT

## 2022-12-28 PROCEDURE — 250N000011 HC RX IP 250 OP 636: Performed by: PHYSICIAN ASSISTANT

## 2022-12-28 PROCEDURE — 250N000011 HC RX IP 250 OP 636: Performed by: FAMILY MEDICINE

## 2022-12-28 PROCEDURE — 99283 EMERGENCY DEPT VISIT LOW MDM: CPT | Mod: CS | Performed by: PHYSICIAN ASSISTANT

## 2022-12-28 PROCEDURE — C9803 HOPD COVID-19 SPEC COLLECT: HCPCS | Performed by: PHYSICIAN ASSISTANT

## 2022-12-28 PROCEDURE — 36415 COLL VENOUS BLD VENIPUNCTURE: CPT | Performed by: PHYSICIAN ASSISTANT

## 2022-12-28 PROCEDURE — 96374 THER/PROPH/DIAG INJ IV PUSH: CPT | Performed by: PHYSICIAN ASSISTANT

## 2022-12-28 PROCEDURE — 80053 COMPREHEN METABOLIC PANEL: CPT | Performed by: PHYSICIAN ASSISTANT

## 2022-12-28 PROCEDURE — 81001 URINALYSIS AUTO W/SCOPE: CPT | Performed by: PHYSICIAN ASSISTANT

## 2022-12-28 RX ORDER — ONDANSETRON 4 MG/1
4 TABLET, ORALLY DISINTEGRATING ORAL ONCE
Status: COMPLETED | OUTPATIENT
Start: 2022-12-28 | End: 2022-12-28

## 2022-12-28 RX ORDER — ONDANSETRON 4 MG/1
4 TABLET, ORALLY DISINTEGRATING ORAL EVERY 8 HOURS PRN
Qty: 5 TABLET | Refills: 0 | Status: SHIPPED | OUTPATIENT
Start: 2022-12-28 | End: 2023-05-05

## 2022-12-28 RX ORDER — ONDANSETRON 2 MG/ML
4 INJECTION INTRAMUSCULAR; INTRAVENOUS ONCE
Status: COMPLETED | OUTPATIENT
Start: 2022-12-28 | End: 2022-12-28

## 2022-12-28 RX ADMIN — SODIUM CHLORIDE 1000 ML: 9 INJECTION, SOLUTION INTRAVENOUS at 18:15

## 2022-12-28 RX ADMIN — ONDANSETRON 4 MG: 4 TABLET, ORALLY DISINTEGRATING ORAL at 14:26

## 2022-12-28 RX ADMIN — ONDANSETRON 4 MG: 2 INJECTION INTRAMUSCULAR; INTRAVENOUS at 18:15

## 2022-12-28 ASSESSMENT — ENCOUNTER SYMPTOMS
DYSURIA: 0
VOMITING: 1
SHORTNESS OF BREATH: 0
NAUSEA: 1
CONFUSION: 0
ABDOMINAL PAIN: 0
FEVER: 0

## 2022-12-28 ASSESSMENT — ACTIVITIES OF DAILY LIVING (ADL): ADLS_ACUITY_SCORE: 35

## 2022-12-28 NOTE — LETTER
December 28, 2022      To Whom It May Concern:      Kathy Hays was seen in our Emergency Department today, 12/28/22.  I expect her condition to improve over the next 2-4 days.  She may return to work/school when improved.    Sincerely,        ERICK Joshua

## 2022-12-28 NOTE — ED TRIAGE NOTES
Pt arrives via private vehicle with nausea and vomiting since 0450 this morning. States she has vomited 21 times since then. Denies any abdominal pain with vomiting. Zofran given in triage.     Triage Assessment     Row Name 12/28/22 1423       Triage Assessment (Adult)    Airway WDL WDL       Respiratory WDL    Respiratory WDL WDL       Skin Circulation/Temperature WDL    Skin Circulation/Temperature WDL WDL       Cardiac WDL    Cardiac WDL WDL       Peripheral/Neurovascular WDL    Peripheral Neurovascular WDL WDL       Cognitive/Neuro/Behavioral WDL    Cognitive/Neuro/Behavioral WDL WDL

## 2022-12-29 NOTE — ED PROVIDER NOTES
History     Chief Complaint   Patient presents with     Nausea & Vomiting     HPI  Kathy Hyas is a 22 year old female who presents to the ED for evaluation of n/v/d. Pt arrives via private vehicle with nausea and vomiting since 0450 this morning. States she has vomited 21 times since then. Denies any abdominal pain with vomiting. Zofran given in triage.      Allergies:  No Known Allergies    Problem List:    Patient Active Problem List    Diagnosis Date Noted     Acne vulgaris      Priority: Medium     Nevus, non-neoplastic 08/27/2008     Priority: Medium     Overview:   Congenital nevus angle of left jaw, is on carotid artery, so technically difficult to remove. Signed by Lisa Winter MD .....8/16/2016 2:23 PM          Past Medical History:    Past Medical History:   Diagnosis Date     Acne vulgaris      Major depression 2020     Malformation of urachus        Past Surgical History:    Past Surgical History:   Procedure Laterality Date     INCISION AND DRAINAGE  01/04/2007    INCISION AND DRAINAGE,Excision of a urachus     TONSILLECTOMY, ADENOIDECTOMY, COMBINED      2007       Family History:    Family History   Problem Relation Age of Onset     Depression Mother      Thyroid Disease Father      Cancer Maternal Grandfather         Cancer,throat       Social History:  Marital Status:  Single [1]  Social History     Tobacco Use     Smoking status: Every Day     Types: Vaping Device     Smokeless tobacco: Never     Tobacco comments:     vaping   Vaping Use     Vaping Use: Every day   Substance Use Topics     Alcohol use: Yes     Comment: 1-2 drinks a week     Drug use: Never        Medications:    ondansetron (ZOFRAN ODT) 4 MG ODT tab  norgestimate-ethinyl estradiol (ORTHO-CYCLEN) 0.25-35 MG-MCG tablet          Review of Systems   Constitutional: Negative for fever.   HENT: Negative for congestion.    Eyes: Negative for visual disturbance.   Respiratory: Negative for shortness of breath.    Cardiovascular:  Negative for chest pain.   Gastrointestinal: Positive for nausea and vomiting. Negative for abdominal pain.   Genitourinary: Negative for dysuria.   Psychiatric/Behavioral: Negative for confusion.       Physical Exam   BP: 108/71  Pulse: 93  Temp: 98.4  F (36.9  C)  Resp: 16  Weight: 70.3 kg (155 lb)  SpO2: 97 %      Physical Exam  Constitutional:       General: She is not in acute distress.     Appearance: She is well-developed. She is not diaphoretic.   HENT:      Head: Normocephalic and atraumatic.   Eyes:      General: No scleral icterus.     Conjunctiva/sclera: Conjunctivae normal.   Cardiovascular:      Rate and Rhythm: Normal rate and regular rhythm.   Pulmonary:      Effort: Pulmonary effort is normal.      Breath sounds: Normal breath sounds.   Abdominal:      Palpations: Abdomen is soft.      Tenderness: There is no abdominal tenderness.   Musculoskeletal:         General: No deformity.      Cervical back: Neck supple.   Lymphadenopathy:      Cervical: No cervical adenopathy.   Skin:     General: Skin is warm and dry.      Coloration: Skin is not jaundiced.      Findings: No rash.   Neurological:      Mental Status: She is alert and oriented to person, place, and time. Mental status is at baseline.   Psychiatric:         Mood and Affect: Mood normal.         Behavior: Behavior normal.         ED Course                 Procedures              Critical Care time:  none               Results for orders placed or performed during the hospital encounter of 12/28/22 (from the past 24 hour(s))   CBC with platelets differential    Narrative    The following orders were created for panel order CBC with platelets differential.  Procedure                               Abnormality         Status                     ---------                               -----------         ------                     CBC with platelets and d...[[id number]]  Abnormal            Final result                 Please view results for these  tests on the individual orders.   Comprehensive metabolic panel   Result Value Ref Range    Sodium 136 136 - 145 mmol/L    Potassium 3.9 3.4 - 5.3 mmol/L    Chloride 103 98 - 107 mmol/L    Carbon Dioxide (CO2) 22 22 - 29 mmol/L    Anion Gap 11 7 - 15 mmol/L    Urea Nitrogen 12.8 6.0 - 20.0 mg/dL    Creatinine 0.54 0.51 - 0.95 mg/dL    Calcium 9.5 8.6 - 10.0 mg/dL    Glucose 107 (H) 70 - 99 mg/dL    Alkaline Phosphatase 63 35 - 104 U/L    AST 16 10 - 35 U/L    ALT 11 10 - 35 U/L    Protein Total 8.2 6.4 - 8.3 g/dL    Albumin 4.7 3.5 - 5.2 g/dL    Bilirubin Total 0.8 <=1.2 mg/dL    GFR Estimate >90 >60 mL/min/1.73m2   Magnesium   Result Value Ref Range    Magnesium 1.8 1.7 - 2.3 mg/dL   CBC with platelets and differential   Result Value Ref Range    WBC Count 14.4 (H) 4.0 - 11.0 10e3/uL    RBC Count 4.91 3.80 - 5.20 10e6/uL    Hemoglobin 15.0 11.7 - 15.7 g/dL    Hematocrit 42.7 35.0 - 47.0 %    MCV 87 78 - 100 fL    MCH 30.5 26.5 - 33.0 pg    MCHC 35.1 31.5 - 36.5 g/dL    RDW 11.9 10.0 - 15.0 %    Platelet Count 290 150 - 450 10e3/uL    % Neutrophils 96 %    % Lymphocytes 1 %    % Monocytes 3 %    % Eosinophils 0 %    % Basophils 0 %    % Immature Granulocytes 0 %    NRBCs per 100 WBC 0 <1 /100    Absolute Neutrophils 13.7 (H) 1.6 - 8.3 10e3/uL    Absolute Lymphocytes 0.2 (L) 0.8 - 5.3 10e3/uL    Absolute Monocytes 0.4 0.0 - 1.3 10e3/uL    Absolute Eosinophils 0.0 0.0 - 0.7 10e3/uL    Absolute Basophils 0.0 0.0 - 0.2 10e3/uL    Absolute Immature Granulocytes 0.0 <=0.4 10e3/uL    Absolute NRBCs 0.0 10e3/uL   Extra Tube    Narrative    The following orders were created for panel order Extra Tube.  Procedure                               Abnormality         Status                     ---------                               -----------         ------                     Extra Blue Top Tube[[id number]]                              Final result               Extra Serum Separator Tu...[[id number]]                      Final  result               Extra Green Top (Lithium...[[id number]]                      Final result                 Please view results for these tests on the individual orders.   Extra Blue Top Tube   Result Value Ref Range    Hold Specimen x    Extra Serum Separator Tube (SST)   Result Value Ref Range    Hold Specimen x    Extra Green Top (Lithium Heparin) ON ICE   Result Value Ref Range    Hold Specimen x    Symptomatic Influenza A/B & SARS-CoV2 (COVID-19) Virus PCR Multiplex Nasopharyngeal    Specimen: Nasopharyngeal; Swab   Result Value Ref Range    Influenza A PCR Negative Negative    Influenza B PCR Negative Negative    RSV PCR Negative Negative    SARS CoV2 PCR Positive (A) Negative    Narrative    Testing was performed using the Xpert Xpress CoV2/Flu/RSV Assay on the [employer] GeneXpert Instrument. This test should be ordered for the detection of SARS-CoV-2 and influenza viruses in individuals who meet clinical and/or epidemiological criteria. Test performance is unknown in asymptomatic patients. This test is for in vitro diagnostic use under the FDA EUA for laboratories certified under CLIA to perform high or moderate complexity testing. This test has not been FDA cleared or approved. A negative result does not rule out the presence of PCR inhibitors in the specimen or target RNA in concentration below the limit of detection for the assay. If only one viral target is positive but coinfection with multiple targets is suspected, the sample should be re-tested with another FDA cleared, approved, or authorized test, if coinfection would change clinical management. This test was validated by the [facility]. These laboratories are certified under the Clinical Laboratory Improvement Amendments of 1988 (CLIA-88) as qualified to perform high complexity laboratory testing.   UA with Microscopic reflex to Culture    Specimen: Urine, Midstream   Result Value Ref Range    Color Urine Yellow Colorless,  Straw, Light Yellow, Yellow    Appearance Urine Clear Clear    Glucose Urine Negative Negative mg/dL    Bilirubin Urine Negative Negative    Ketones Urine 150 (A) Negative mg/dL    Specific Gravity Urine 1.030 1.000 - 1.030    Blood Urine Negative Negative    pH Urine 6.0 5.0 - 9.0    Protein Albumin Urine 20 (A) Negative mg/dL    Urobilinogen Urine Normal Normal, 2.0 mg/dL    Nitrite Urine Negative Negative    Leukocyte Esterase Urine Negative Negative    Mucus Urine Present (A) None Seen /LPF    RBC Urine 3 (H) <=2 /HPF    WBC Urine 1 <=5 /HPF    Squamous Epithelials Urine 1 <=1 /HPF    Narrative    Urine Culture not indicated   HCG qualitative urine (UPT)   Result Value Ref Range    hCG Urine Qualitative Negative Negative       Medications   ondansetron (ZOFRAN ODT) ODT tab 4 mg (4 mg Oral Given 12/28/22 1426)   0.9% sodium chloride BOLUS (0 mLs Intravenous Stopped 12/28/22 1915)   ondansetron (ZOFRAN) injection 4 mg (4 mg Intravenous Given 12/28/22 1815)       Assessments & Plan (with Medical Decision Making)   Pt nontoxic in NAD. Heart, lung, bowel sounds normal, abd soft, nontender to palpation, nondistended. VSS, afebrile.     Slight increase in WBC to 14.4 otherwise very well-appearing lab work.  She is positive for COVID.    She was given fluids and Zofran and reports feeling much better.    We discussed your COVID study, she took some home test approximately 1 month ago and was positive at that time.  Is difficult to determine whether this is a new positive COVID or if this is just still testing positive from her old illness.  The COVID illness would explain the symptoms she is having however.    In general she seems to be doing very well.  Most likely giving her nausea, vomiting and diarrhea with her otherwise well appearance and reassuring studies I would expect this illness to be self-limiting.  I think she should quarantine until symptoms resolved.  We will send her home with Zofran.  She is p.o.  challenged in the ED and does very well with this.  She is told to return if she has intractable fevers, abdominal pain, dehydration etc.  She and her family understand agree with the plan the patient is discharged.    Gerardo Jacobo PA-C    I have reviewed the nursing notes.    I have reviewed the findings, diagnosis, plan and need for follow up with the patient.             Discharge Medication List as of 12/28/2022  7:35 PM      START taking these medications    Details   ondansetron (ZOFRAN ODT) 4 MG ODT tab Take 1 tablet (4 mg) by mouth every 8 hours as needed for nausea, Disp-5 tablet, R-0, InstyMeds             Final diagnoses:   Vomiting and diarrhea       12/28/2022   [facility] AND \[facility]\""     Gerardo Jacobo PA  12/28/22 1950     Jabari Benitez(Attending)

## 2022-12-29 NOTE — DISCHARGE INSTRUCTIONS
Get plenty of fluids and rest.  As discussed, all of your lab work appeared very well.  He did have you being positive for COVID however.  Recently positive is difficult to say whether this is new or if you are just still testing positive for past infection.  Usually, vomiting and diarrhea are self-limiting which means over the next few days  your symptoms will usually improve on their own.  I think you should quarantine until symptom-free.  We will send you home with some antinausea medication.  You should return if you are becoming dehydrated, increased fevers, increased pain etc.

## 2023-05-05 ENCOUNTER — VIRTUAL VISIT (OUTPATIENT)
Dept: OBGYN | Facility: OTHER | Age: 23
End: 2023-05-05
Attending: FAMILY MEDICINE
Payer: COMMERCIAL

## 2023-05-05 VITALS — BODY MASS INDEX: 24.11 KG/M2 | HEIGHT: 66 IN | WEIGHT: 150 LBS

## 2023-05-05 DIAGNOSIS — O36.80X0 ENCOUNTER TO DETERMINE FETAL VIABILITY OF PREGNANCY, SINGLE OR UNSPECIFIED FETUS: Primary | ICD-10-CM

## 2023-05-05 PROCEDURE — 99211 OFF/OP EST MAY X REQ PHY/QHP: CPT

## 2023-05-05 ASSESSMENT — ANXIETY QUESTIONNAIRES
IF YOU CHECKED OFF ANY PROBLEMS ON THIS QUESTIONNAIRE, HOW DIFFICULT HAVE THESE PROBLEMS MADE IT FOR YOU TO DO YOUR WORK, TAKE CARE OF THINGS AT HOME, OR GET ALONG WITH OTHER PEOPLE: VERY DIFFICULT
6. BECOMING EASILY ANNOYED OR IRRITABLE: NEARLY EVERY DAY
1. FEELING NERVOUS, ANXIOUS, OR ON EDGE: MORE THAN HALF THE DAYS
GAD7 TOTAL SCORE: 11
GAD7 TOTAL SCORE: 11
5. BEING SO RESTLESS THAT IT IS HARD TO SIT STILL: NOT AT ALL
3. WORRYING TOO MUCH ABOUT DIFFERENT THINGS: MORE THAN HALF THE DAYS
2. NOT BEING ABLE TO STOP OR CONTROL WORRYING: MORE THAN HALF THE DAYS
7. FEELING AFRAID AS IF SOMETHING AWFUL MIGHT HAPPEN: NOT AT ALL

## 2023-05-05 ASSESSMENT — PAIN SCALES - GENERAL: PAINLEVEL: NO PAIN (0)

## 2023-05-05 ASSESSMENT — PATIENT HEALTH QUESTIONNAIRE - PHQ9
SUM OF ALL RESPONSES TO PHQ QUESTIONS 1-9: 4
5. POOR APPETITE OR OVEREATING: MORE THAN HALF THE DAYS

## 2023-05-05 NOTE — PROGRESS NOTES
Verbal consent obtained for telephone visit. Total length of call: 30 min    HPI:    This is a 22 year old female patient,  who was called today for OB Intake visit. Patient reports positive pregnancy test at home.     Obstetrical history and OB Demographics updated to the best of this nurse's ability based on patient report. PHQ-9 depression screening and routine Domestic Abuse screening completed. All immediate questions and concerns answered.    FOOD SECURITY SCREENING QUESTIONS:    The next two questions are to help us understand your food security.  If you are feeling you need any assistance in this area, we have resources available to support you today.    Hunger Vital Signs:  Within the past 12 months we worried whether our food would run out before we got money to buy more. Never  Within the past 12 months the food we bought just didn't last and we didn't have money to get more. Never    Last menstrual period is reported as Patient's last menstrual period was 2023 (exact date). CHICO based on LMP is Estimated Date of Delivery: 2024.  Her cycles are regular.  Her last menstrual period was normal.   Since her LMP, she has experienced  nausea, abdominal pain, headache, loss of appetite, vaginal discharge and constipation.       OBSTETRIC HISTORY:    OB History    Para Term  AB Living   1 0 0 0 0 0   SAB IAB Ectopic Multiple Live Births   0 0 0 0 0      # Outcome Date GA Lbr Darrell/2nd Weight Sex Delivery Anes PTL Lv   1 Current                Age of first pregnancy: 22 (current)  Previous OB Provider: n/a-first pregnancy  Previous Delivering Clinic:   Release of Records:     Current delivery plan: [place]   Preferred OB Provider: Dr. Murphy  Current Primary Care Provider: Dr. Graeme Mathew  Pediatrician: unsure    Additional History: Smokes marijuana twice daily.    Have you travelled during the pregnancy?No  Have your sexual partner(s) travelled during the  pregnancy?No      HISTORY:   Planned Pregnancy: Yes  Marital Status: Single, In a relationship with [place].  Occupation: CNA  Living in Household: Significant Other    Father of the baby is involved.   Family and father of baby is supportive of current pregnancy.  Past Medical History of Father of Baby:No significant medical history    Past History:  Her past medical history   Past Medical History:   Diagnosis Date     Acne vulgaris      Major depression 2020     Malformation of urachus        .      Her past surgical history:   Past Surgical History:   Procedure Laterality Date     INCISION AND DRAINAGE  2007    INCISION AND DRAINAGE,Excision of a urachus     TONSILLECTOMY, ADENOIDECTOMY, COMBINED         She has a history of  no prior pregnancies    Since her last LMP she denies use of alcohol, tobacco and street drugs and admits to the use of marajuana.    Pap smear history: NO - under age 21, PAP not appropriate for age    STD/STI history: No STD history    STD/STI symptoms: no noticeable symptoms     Past medical, surgical, social and family history were reviewed and updated in EPIC.    Medications reviewed by this nurse. Current medication list:  No current outpatient medications on file.     The following medications were recommended to be discontinued due to Pregnancy Category D status: none  Patient informed to contact her primary care provider as soon as possible to discuss a safer alternative.    Risk factors:  Moderate and moderately severe risks (consult with OB/Gyn)  Previous fetal or  demise: No  History of  delivery: No  History of heart disease Class I: No  Severe anemia, unresponsive to iron therapy: No  Pelvic mass or neoplasm: No  Previous : No  Hyper/hypothyroidism: No  History of postpartum hemorrhage requiring transfusion:No  History of Placenta Accreta: No    High Risk (Pregnancy managed by OB/Gyn)  Multiple pregnancy: No  Pre-gestational diabetes:  No  Chronic Hypertension: No  Renal Failure: No  Heart disease, class II or greater: No  Rh Isoimmunization: No  Chronic active hepatitis: No  Convulsive disorder, poorly controlled: No  Isoimmune thrombocytopenia: No  Pre-term premature rupture of membranes: No  Lupus or other autoimmune disorder: No  Human Immunodeficiency Virus: No      ASSESSMENT/PLAN:       ICD-10-CM    1. Encounter to determine fetal viability of pregnancy, single or unspecified fetus  O36.80X0           22 year old , 4w6d of pregnancy with CHICO of 2024, by Last Menstrual Period    Per standing orders and scope of practice of this nurse, patient will have the following orders placed and completed prior to initial OB visit with the appropriate provider:    --early ultrasound for dating and viability ordered for 6+ weeks gestation based on LMP    --Quantitative Beta HCG and progesterone monitoring if indicated    Counseling given:     - Recommended weight gain for pregnancy: 25-35 lbs.   BMI < 18.5  28-40 lbs   18.5 - 24.9 25-35   25 - 29.9 15-25   > 30  < 15       PLAN/PATIENT INSTRUCTIONS:    Follow up in 3-5 weeks.  Normal exercise.  Normal sexual activity.  Prenatal vitamins.  Anticipated weight gain.    follow-up appointment with Dr. Sumi Murphy for pre- care and take multivitamin or pre- vitamins    Ebony Heredia RN.................................................. 2023 8:50 AM

## 2023-05-10 ENCOUNTER — MYC MEDICAL ADVICE (OUTPATIENT)
Dept: FAMILY MEDICINE | Facility: OTHER | Age: 23
End: 2023-05-10
Payer: COMMERCIAL

## 2023-05-11 NOTE — TELEPHONE ENCOUNTER
Patient call, verification of name and . Patient states concerns have tapered as of this date. Loose stool is still happening, although more formed. Denies vaginal spotting or discharge concerns. Denies fever, abdominal pain or nausea. Encouraged patient to continue with hydration increase. Monitoring intake for possible sources of GI irritation. Discussed eating bland food items like toast, bananas, rice, to help with more formed stool. Discussed emergent criteria of diarrhea and dehydration and when to seek emergent attention. Patient states she feels well hydrated and not ill at this time. Will call back if further questions or concerns arise. Reyna Wong RN ....................  2023   2:57 PM

## 2023-05-15 ENCOUNTER — HOSPITAL ENCOUNTER (EMERGENCY)
Facility: OTHER | Age: 23
Discharge: HOME | End: 2023-05-15
Attending: EMERGENCY MEDICINE | Admitting: EMERGENCY MEDICINE
Payer: COMMERCIAL

## 2023-05-15 VITALS
DIASTOLIC BLOOD PRESSURE: 84 MMHG | SYSTOLIC BLOOD PRESSURE: 129 MMHG | RESPIRATION RATE: 18 BRPM | TEMPERATURE: 98.5 F | HEART RATE: 97 BPM | OXYGEN SATURATION: 99 %

## 2023-05-15 DIAGNOSIS — R82.71 ASYMPTOMATIC BACTERIURIA DURING PREGNANCY: ICD-10-CM

## 2023-05-15 DIAGNOSIS — O99.891 ASYMPTOMATIC BACTERIURIA DURING PREGNANCY: ICD-10-CM

## 2023-05-15 DIAGNOSIS — O20.9 FIRST TRIMESTER BLEEDING: ICD-10-CM

## 2023-05-15 DIAGNOSIS — O20.0 THREATENED ABORTION: ICD-10-CM

## 2023-05-15 DIAGNOSIS — O36.80X0 ENCOUNTER TO DETERMINE FETAL VIABILITY OF PREGNANCY, SINGLE OR UNSPECIFIED FETUS: Primary | ICD-10-CM

## 2023-05-15 LAB
ABO/RH(D): NORMAL
ALBUMIN UR-MCNC: 10 MG/DL
APPEARANCE UR: CLEAR
BACTERIA #/AREA URNS HPF: ABNORMAL /HPF
BILIRUB UR QL STRIP: NEGATIVE
COLOR UR AUTO: ABNORMAL
GLUCOSE UR STRIP-MCNC: NEGATIVE MG/DL
HCG INTACT+B SERPL-ACNC: 5869 MIU/ML
HGB UR QL STRIP: ABNORMAL
HOLD SPECIMEN: NORMAL
KETONES UR STRIP-MCNC: 80 MG/DL
LEUKOCYTE ESTERASE UR QL STRIP: NEGATIVE
MUCOUS THREADS #/AREA URNS LPF: PRESENT /LPF
NITRATE UR QL: NEGATIVE
PH UR STRIP: 6 [PH] (ref 5–9)
RBC URINE: 87 /HPF
SP GR UR STRIP: 1.02 (ref 1–1.03)
SPECIMEN EXPIRATION DATE: NORMAL
UROBILINOGEN UR STRIP-MCNC: NORMAL MG/DL
WBC URINE: 2 /HPF

## 2023-05-15 PROCEDURE — 81001 URINALYSIS AUTO W/SCOPE: CPT | Performed by: EMERGENCY MEDICINE

## 2023-05-15 PROCEDURE — 99284 EMERGENCY DEPT VISIT MOD MDM: CPT | Performed by: EMERGENCY MEDICINE

## 2023-05-15 PROCEDURE — 250N000013 HC RX MED GY IP 250 OP 250 PS 637: Performed by: EMERGENCY MEDICINE

## 2023-05-15 PROCEDURE — 99283 EMERGENCY DEPT VISIT LOW MDM: CPT | Performed by: EMERGENCY MEDICINE

## 2023-05-15 PROCEDURE — 86901 BLOOD TYPING SEROLOGIC RH(D): CPT | Performed by: EMERGENCY MEDICINE

## 2023-05-15 PROCEDURE — 84144 ASSAY OF PROGESTERONE: CPT

## 2023-05-15 PROCEDURE — 36415 COLL VENOUS BLD VENIPUNCTURE: CPT | Performed by: EMERGENCY MEDICINE

## 2023-05-15 PROCEDURE — 84702 CHORIONIC GONADOTROPIN TEST: CPT | Performed by: EMERGENCY MEDICINE

## 2023-05-15 RX ORDER — NITROFURANTOIN 25; 75 MG/1; MG/1
100 CAPSULE ORAL ONCE
Status: DISCONTINUED | OUTPATIENT
Start: 2023-05-15 | End: 2023-05-15

## 2023-05-15 RX ORDER — NITROFURANTOIN 25; 75 MG/1; MG/1
100 CAPSULE ORAL 2 TIMES DAILY
Qty: 14 CAPSULE | Refills: 0 | Status: SHIPPED | OUTPATIENT
Start: 2023-05-15 | End: 2023-05-15

## 2023-05-15 RX ORDER — NITROFURANTOIN 25; 75 MG/1; MG/1
100 CAPSULE ORAL ONCE
Status: COMPLETED | OUTPATIENT
Start: 2023-05-15 | End: 2023-05-15

## 2023-05-15 RX ORDER — NITROFURANTOIN 25; 75 MG/1; MG/1
100 CAPSULE ORAL 2 TIMES DAILY
Qty: 10 CAPSULE | Refills: 0 | Status: SHIPPED | OUTPATIENT
Start: 2023-05-15 | End: 2023-05-20

## 2023-05-15 RX ADMIN — NITROFURANTOIN (MONOHYDRATE/MACROCRYSTALS) 100 MG: 75; 25 CAPSULE ORAL at 05:14

## 2023-05-15 ASSESSMENT — ACTIVITIES OF DAILY LIVING (ADL)
ADLS_ACUITY_SCORE: 33
ADLS_ACUITY_SCORE: 33

## 2023-05-15 NOTE — ED TRIAGE NOTES
Arrived from home via private vehicle.  CC of mild to moderate vaginal bleeding after intercourse approximately one hour ago.  6 weeks pregnant.  No changes to intercourse from usual, but bleeding began after completion. Alert and oriented.      Triage Assessment     Row Name 05/15/23 0249       Triage Assessment (Adult)    Airway WDL WDL       Respiratory WDL    Respiratory WDL WDL       Skin Circulation/Temperature WDL    Skin Circulation/Temperature WDL WDL       Cardiac WDL    Cardiac WDL WDL       Peripheral/Neurovascular WDL    Peripheral Neurovascular WDL WDL       Cognitive/Neuro/Behavioral WDL    Cognitive/Neuro/Behavioral WDL WDL

## 2023-05-15 NOTE — ED PROVIDER NOTES
History     Chief Complaint   Patient presents with     Pregnancy Complications     HPI  Kathy Hays is a 22 year old female G1 who presents with vaginal spotting. LMP 4, positive pregnancy test at home. After intercourse today had spotting which then became more severe and darker. She has no cramping. Mild nausea, no vomiting.  No fever, dysuria, chest pain, dyspnea. Otherwise healthy on no medications other than prenatal vitamins    Allergies:  No Known Allergies    Problem List:    Patient Active Problem List    Diagnosis Date Noted     Acne vulgaris      Priority: Medium     Nevus, non-neoplastic 2008     Priority: Medium     Overview:   Congenital nevus angle of left jaw, is on carotid artery, so technically difficult to remove. Signed by Lisa Winter MD .....2016 2:23 PM          Past Medical History:    Past Medical History:   Diagnosis Date     Acne vulgaris      Major depression 2020     Malformation of urachus        Past Surgical History:    Past Surgical History:   Procedure Laterality Date     INCISION AND DRAINAGE  2007    INCISION AND DRAINAGE,Excision of a urachus     TONSILLECTOMY, ADENOIDECTOMY, COMBINED         Family History:    Family History   Problem Relation Age of Onset     Depression Mother      Thyroid Disease Father      Cancer Maternal Grandfather         Cancer,throat       Social History:  Marital Status:  Single [1]  Social History     Tobacco Use     Smoking status: Former     Types: Vaping Device     Quit date: 2023     Years since quittin.3     Smokeless tobacco: Never   Vaping Use     Vaping status: Former     Substances: Nicotine, THC     Devices: Disposable   Substance Use Topics     Alcohol use: Not Currently     Comment: 1-2 drinks a week     Drug use: Yes     Types: Marijuana     Comment: Twice a day.        Medications:    nitroFURantoin macrocrystal-monohydrate (MACROBID) 100 MG capsule          Review of Systems  Please seen HPI for  pertinent positives and negatives. All other systems reviewed and found to be negative.   Physical Exam   BP: 129/84  Pulse: 97  Temp: 98.5  F (36.9  C)  Resp: 18  SpO2: 99 %      Physical Exam  Constitutional:       General: She is not in acute distress.     Appearance: She is not ill-appearing.   HENT:      Head: Normocephalic and atraumatic.      Nose: Nose normal.      Mouth/Throat:      Mouth: Mucous membranes are moist.      Pharynx: Oropharynx is clear.   Eyes:      Conjunctiva/sclera: Conjunctivae normal.      Pupils: Pupils are equal, round, and reactive to light.   Cardiovascular:      Rate and Rhythm: Normal rate and regular rhythm.   Pulmonary:      Effort: Pulmonary effort is normal.      Breath sounds: Normal breath sounds.   Abdominal:      Palpations: Abdomen is soft.      Tenderness: There is no abdominal tenderness.   Genitourinary:     General: Normal vulva.      Comments: No significant hemorrhage on external exam  Musculoskeletal:      Cervical back: Normal range of motion.      Right lower leg: No edema.      Left lower leg: No edema.   Skin:     General: Skin is warm and dry.   Neurological:      Mental Status: She is alert and oriented to person, place, and time.         ED Course              ED Course as of 05/16/23 0611   Mon May 15, 2023   0428 ABO/Rh(D): AB POS  Rho leah not required   0445 hCG Quantitative(!): 5,869  Below discriminatory zone for transabdominal US.     Procedures              Critical Care time:  none               No results found for this or any previous visit (from the past 24 hour(s)).    Medications   nitroFURantoin macrocrystal-monohydrate (MACROBID) capsule 100 mg (100 mg Oral $Given 5/15/23 0514)       Assessments & Plan (with Medical Decision Making)     I have reviewed the nursing notes.    I have reviewed the findings, diagnosis, plan and need for follow up with the patient.     Ms Hays is a 21 yo woman, G1 at about 6 weeks who presents with spotting after  intercourse. HCG is appropriate but not high enough to reliably visualize fetus on ultrasound. Urinalysis shows some bacteria, prescribed macrobid. Rh positive, does not need rho leah. Recommended follow up with OB in 24-48 hours for repeat HCG, referral placed. Return precautions discussed as detailed in AVS. Patient expressed understanding.       Medical Decision Making  The patient's presentation was of moderate complexity (an undiagnosed new problem with uncertain diagnosis).    The patient's evaluation involved:  ordering and/or review of 3+ test(s) in this encounter (see separate area of note for details)    The patient's management necessitated moderate risk (prescription drug management including medications given in the ED).        Discharge Medication List as of 5/15/2023  5:15 AM          Final diagnoses:   Threatened    Asymptomatic bacteriuria during pregnancy       5/15/2023   [facility] AND [facility]     Trini Concepcion MD  23 0612

## 2023-05-15 NOTE — DISCHARGE INSTRUCTIONS
Take your antibiotics for 5 days twice a day   Make sure you are drinking plenty of water.  Most adults need 1 to 2 L/day.  Urine should be light/clear   Follow-up with OB early this coming week (within 48 hours) for repeat hCG level.  Call today  Seek emergency care for worsening bleeding, cramping, fever, vomiting/dehydration, or if you have any new or changing symptoms/concerns.

## 2023-05-16 DIAGNOSIS — O20.9 FIRST TRIMESTER BLEEDING: Primary | ICD-10-CM

## 2023-05-16 LAB — PROGEST SERPL-MCNC: 3.9 NG/ML

## 2023-05-17 ENCOUNTER — LAB (OUTPATIENT)
Dept: LAB | Facility: OTHER | Age: 23
End: 2023-05-17
Payer: COMMERCIAL

## 2023-05-17 DIAGNOSIS — O36.80X0 ENCOUNTER TO DETERMINE FETAL VIABILITY OF PREGNANCY, SINGLE OR UNSPECIFIED FETUS: ICD-10-CM

## 2023-05-17 DIAGNOSIS — O20.0 THREATENED ABORTION: ICD-10-CM

## 2023-05-17 DIAGNOSIS — O20.9 FIRST TRIMESTER BLEEDING: Primary | ICD-10-CM

## 2023-05-17 LAB
HCG INTACT+B SERPL-ACNC: 9411 MIU/ML
PROGEST SERPL-MCNC: 5 NG/ML

## 2023-05-17 PROCEDURE — 36415 COLL VENOUS BLD VENIPUNCTURE: CPT | Mod: ZL

## 2023-05-17 PROCEDURE — 84702 CHORIONIC GONADOTROPIN TEST: CPT | Mod: ZL

## 2023-05-17 PROCEDURE — 84144 ASSAY OF PROGESTERONE: CPT | Mod: ZL

## 2023-05-19 ENCOUNTER — LAB (OUTPATIENT)
Dept: LAB | Facility: OTHER | Age: 23
End: 2023-05-19
Attending: FAMILY MEDICINE
Payer: COMMERCIAL

## 2023-05-19 DIAGNOSIS — O20.9 FIRST TRIMESTER BLEEDING: ICD-10-CM

## 2023-05-19 LAB — HCG INTACT+B SERPL-ACNC: ABNORMAL MIU/ML

## 2023-05-19 PROCEDURE — 84702 CHORIONIC GONADOTROPIN TEST: CPT | Mod: ZL

## 2023-05-19 PROCEDURE — 36415 COLL VENOUS BLD VENIPUNCTURE: CPT | Mod: ZL

## 2023-05-19 NOTE — RESULT ENCOUNTER NOTE
Patient notified and all immediate questions were answered. She reports her bleeding has resolved. Reviewed ectopic precautions. Patient will follow up as scheduled.    Jihan Romero RN...........5/19/2023 4:27 PM

## 2023-05-30 ENCOUNTER — HOSPITAL ENCOUNTER (OUTPATIENT)
Dept: ULTRASOUND IMAGING | Facility: OTHER | Age: 23
Discharge: HOME | End: 2023-05-30
Attending: STUDENT IN AN ORGANIZED HEALTH CARE EDUCATION/TRAINING PROGRAM
Payer: COMMERCIAL

## 2023-05-30 ENCOUNTER — PRENATAL OFFICE VISIT (OUTPATIENT)
Dept: OBGYN | Facility: OTHER | Age: 23
End: 2023-05-30
Attending: STUDENT IN AN ORGANIZED HEALTH CARE EDUCATION/TRAINING PROGRAM
Payer: COMMERCIAL

## 2023-05-30 VITALS
DIASTOLIC BLOOD PRESSURE: 64 MMHG | BODY MASS INDEX: 24.84 KG/M2 | SYSTOLIC BLOOD PRESSURE: 122 MMHG | WEIGHT: 153.9 LBS | HEART RATE: 88 BPM

## 2023-05-30 DIAGNOSIS — Z34.90 PREGNANCY AT EARLY STAGE: Primary | ICD-10-CM

## 2023-05-30 DIAGNOSIS — O36.80X0 ENCOUNTER TO DETERMINE FETAL VIABILITY OF PREGNANCY, SINGLE OR UNSPECIFIED FETUS: ICD-10-CM

## 2023-05-30 DIAGNOSIS — O36.80X0 ENCOUNTER TO DETERMINE FETAL VIABILITY OF PREGNANCY, SINGLE OR UNSPECIFIED FETUS: Primary | ICD-10-CM

## 2023-05-30 PROCEDURE — 76801 OB US < 14 WKS SINGLE FETUS: CPT

## 2023-05-30 PROCEDURE — 99203 OFFICE O/P NEW LOW 30 MIN: CPT | Performed by: STUDENT IN AN ORGANIZED HEALTH CARE EDUCATION/TRAINING PROGRAM

## 2023-05-30 RX ORDER — PRENATAL VIT/IRON FUM/FOLIC AC 27MG-0.8MG
1 TABLET ORAL DAILY
COMMUNITY
End: 2023-06-17

## 2023-05-30 NOTE — PROGRESS NOTES
New OB Visit    History of Present Illness:  Ms. Kathy Hays is a 23 year old yo  here today for a new OB visit. She had an US right before our visit and an intrauterine pregnancy was seen, with a yolk sac and fetal pole measuring 6w2d. There was no evidence of fetal cardiac activity, however. We discussed in detail that this could be sign of a failed pregnancy, or could be that her dating earlier than anticipated and it is still too small to have cardiac activity noted. There was subchorionic hemorrhage noted on the scan, but we discussed that this alone is not diagnostic for failed pregnancy either. It could be, but also could be from implantation.    We reviewed the ACOG clinical criteria for a confirmed failed pregnancy and she did not meet any of the formal criteria. We discussed the next steps in repeating an US in 11 days to further work this up based on recommendations.     She denies any vaginal bleeding or cramping. She did have light spotting a few weeks ago, but this resolved after only a few hours. At that time she presented to the ER and hcg value was obtained, but no US performed.      Medical History:  Past Medical History:   Diagnosis Date     Acne vulgaris      Major depression 2020     Malformation of urachus          She denies any chronic medical conditions: specifically denies asthma, HTN, DM    Obstetric History:  G1    Past Surgical History:  Past Surgical History:   Procedure Laterality Date     INCISION AND DRAINAGE  2007    INCISION AND DRAINAGE,Excision of a urachus     TONSILLECTOMY, ADENOIDECTOMY, COMBINED         Family Medical History:  Family History   Problem Relation Age of Onset     Depression Mother      Thyroid Disease Father      Cancer Maternal Grandfather         Cancer,throat     Medications:  No current outpatient medications on file.     No current facility-administered medications for this visit.       Allergies:   No Known  Allergies      ROS:   Skin: negative for rash, bruising  Eyes: negative for visual blurring, double vision  Ears/Nose/Throat: negative for nasal congestion, vertigo  Respiratory: No shortness of breath, dyspnea on exertion, cough, or hemoptysis  Cardiovascular: negative for palpitations, chest pain, lower extremity edema and syncope or near-syncope  Gastrointestinal: negative for, nausea, vomiting and hematemesis  Genitourinary: negative for, dysuria, frequency and urgency  Musculoskeletal: negative for, back pain and muscular weakness  Neurologic: negative for, headaches, syncope, seizures and local weakness  Psychiatric: negative for, anxiety, depression and hallucinations  Hematologic/Lymphatic/Immunologic: negative for, anemia, chills and fever      Assessment:  Ms. Kathy Hays is a 23 year old yo  here for New OB visit, US shows intrauterine pregnancy with fetal pole, but no cardiac activity. She does not meet formal criteria for failed pregnancy, and repeat US ordered for at least 11 days after last US for confirmation.        We discussed at length the requirements for the definitive diagnosis of a failed pregnancy as one of the following:  - Crown-rump length of 7 mm or greater and no fetal heartbeat   CRL is 5 mm on US   - Mean sac diameter of 25 mm or greater with no embryo present   Mean Sac measures 20 mm  - Absence of embryo with heartbeat 14 or more days after a scan that showed a gestational sac without a yolk sac    - Absence of embryo with heartbeat 11 or more days after a scan that showed a gestational sac WITH a yolk sac   This is what we are following up on , US scheduled  to follow up.    Total amount of time spent during today's encounter including chart prep, face to face, review of US and documentation was 30 minutes    Sumi Murphy MD  OB/GYN  2023 10:45 AM

## 2023-06-13 ENCOUNTER — HOSPITAL ENCOUNTER (OUTPATIENT)
Dept: ULTRASOUND IMAGING | Facility: OTHER | Age: 23
Discharge: HOME | End: 2023-06-13
Attending: STUDENT IN AN ORGANIZED HEALTH CARE EDUCATION/TRAINING PROGRAM
Payer: COMMERCIAL

## 2023-06-13 ENCOUNTER — PRENATAL OFFICE VISIT (OUTPATIENT)
Dept: OBGYN | Facility: OTHER | Age: 23
End: 2023-06-13
Attending: STUDENT IN AN ORGANIZED HEALTH CARE EDUCATION/TRAINING PROGRAM
Payer: COMMERCIAL

## 2023-06-13 VITALS
DIASTOLIC BLOOD PRESSURE: 70 MMHG | SYSTOLIC BLOOD PRESSURE: 128 MMHG | BODY MASS INDEX: 25.68 KG/M2 | HEART RATE: 102 BPM | WEIGHT: 159.1 LBS

## 2023-06-13 DIAGNOSIS — Z71.89 SURGICAL COUNSELING VISIT: ICD-10-CM

## 2023-06-13 DIAGNOSIS — O36.80X0 ENCOUNTER TO DETERMINE FETAL VIABILITY OF PREGNANCY, SINGLE OR UNSPECIFIED FETUS: ICD-10-CM

## 2023-06-13 DIAGNOSIS — O02.1 MISSED AB: Primary | ICD-10-CM

## 2023-06-13 PROCEDURE — 76817 TRANSVAGINAL US OBSTETRIC: CPT

## 2023-06-13 PROCEDURE — 99214 OFFICE O/P EST MOD 30 MIN: CPT | Performed by: STUDENT IN AN ORGANIZED HEALTH CARE EDUCATION/TRAINING PROGRAM

## 2023-06-13 ASSESSMENT — PAIN SCALES - GENERAL: PAINLEVEL: NO PAIN (0)

## 2023-06-13 NOTE — NURSING NOTE
"Date of Surgery: 6/15/23  Type of Surgery: Suction, Dilation & Curettage   Surgeon: Dr. Sumi Murphy     Patient was given surgical folder  appropriate appointments were scheduled by the [facility] .. Surgical forms were copied and kept for informative purposes. Originals were delivered to Day-surgery. Questions were answered to the best of this nurse's ability.        STOP BANG    Fever/Chills or other infectious symptoms in past month? no  >10 pound weight loss in the past 2 months? no  Health Care Directive on file? no  History of blood transfusions? no  Td up to date? yes  History of VRE/MRSA? no      Obstructive Sleep Apnea screening    Preoperative Evaluation: Obstructive Sleep Apnea screening    S: Snore -  Do you snore loudly? (louder than talking or loud enough to be heard through closed doors) No  T: Tired - Do you often feel tired, fatigued, or sleepy during the daytime?No  O: Observed - Has anyone ever observed you stop breathing during your sleep?No  P: Pressure - Do you have or are you being treated for high blood pressure?No  B: BMI - BMI greater than 35kg/m2?No  A: Age - Age over 50 years old?No  N: Neck - Neck circumference greater than 40 cm?No  G: Gender - Gender: Male?No    Total number of \"YES\" responses:  0      Scoring: Low risk of MARK 0-2  At Risk of MARK: >3 High Risk of MARK: 5-8      Total yes answers in MARK section:    Low risk 0-2  At risk 3-4  High risk 5-8    Jihan Romero RN............. 6/13/2023 10:43 AM     "

## 2023-06-13 NOTE — PROGRESS NOTES
OBGYN Office Visit    History of Present Illness:  Ms. Kathy Hays is a 23 year old yo  here today due to concerns for a miscarriage. She had an US a few weeks ago which showed an early intrauterine pregnancy without fetal cardiac activity-- but was not reaching any definitive criteria for failed pregnancy. US today for follow up confirms failed IUP.    We reviewed the options in detail below. She has not had any signs of infection or bleeding. She has had light spotting over the weekend, but no bright red blood.    Past Medical History:  Past Medical History:   Diagnosis Date     Acne vulgaris      Major depression      Malformation of urachus          She denies any chronic medical conditions: specifically denies asthma, HTN, DM    OB History:   : current, Missed AB      Past Surgical History:  Past Surgical History:   Procedure Laterality Date     INCISION AND DRAINAGE  2007    INCISION AND DRAINAGE,Excision of a urachus     TONSILLECTOMY, ADENOIDECTOMY, COMBINED         Family History:  Family History   Problem Relation Age of Onset     Depression Mother      Thyroid Disease Father      Cancer Maternal Grandfather         Cancer,throat     Specifically denies breast, ovarian, endometrial and colon cancers in her family  She also is not aware of any familial thrombophilias and coagulopathies in her family    Social History:  Social History     Socioeconomic History     Marital status: Single     Spouse name: Not on file     Number of children: Not on file     Years of education: 12     Highest education level: Not on file   Occupational History     Employer: [employer]   Tobacco Use     Smoking status: Former     Types: Vaping Device     Quit date: 2023     Years since quittin.4     Smokeless tobacco: Never   Vaping Use     Vaping status: Former     Substances: Nicotine, THC     Devices: Disposable   Substance and Sexual Activity     Alcohol use: Not Currently      Comment: 1-2 drinks a week     Drug use: Yes     Types: Marijuana     Comment: Twice a day.     Sexual activity: Yes     Partners: Male     Birth control/protection: None   Other Topics Concern     Not on file   Social History Narrative    Mom-Gabi    Dad- Howard    younger brother-Ameya     sister- Velma    1/2 brothers - Mundo Hawley    Works at [employer]    In school for [employer] design      Social Determinants of Health     Financial Resource Strain: Low Risk  (1/18/2021)    Overall Financial Resource Strain (CARDIA)      Difficulty of Paying Living Expenses: Not hard at all   Food Insecurity: No Food Insecurity (1/18/2021)    Hunger Vital Sign      Worried About Running Out of Food in the Last Year: Never true      Ran Out of Food in the Last Year: Never true   Transportation Needs: No Transportation Needs (1/18/2021)    PRAPARE - Transportation      Lack of Transportation (Medical): No      Lack of Transportation (Non-Medical): No   Physical Activity: Not on file   Stress: Stress Concern Present (1/18/2021)    [place] [employer] - Occupational Stress Questionnaire      Feeling of Stress : To some extent   Social Connections: Unknown (1/18/2021)    Social Connection and Isolation Panel [NHANES]      Frequency of Communication with Friends and Family: Not asked      Frequency of Social Gatherings with Friends and Family: Not asked      Attends Denominational Services: Not asked      Active Member of Clubs or Organizations: Not asked      Attends Club or Organization Meetings: Not asked      Marital Status: Never    Intimate Partner Violence: Unknown (1/18/2021)    Humiliation, Afraid, Rape, and Kick questionnaire      Fear of Current or Ex-Partner: Not asked      Emotionally Abused: Not asked      Physically Abused: Not asked      Sexually Abused: Not asked   Housing Stability: Not on file       Exam:  Physical Exam  /70   Pulse 102   Wt 72.2 kg (159 lb 1.6 oz)   LMP 04/01/2023  (Exact Date)   BMI 25.68 kg/m    Gen: Well-appearing, no acute distressed, well-groomed, alert  Cardiovascular: Regular rate, No peripheral edema, normal peripheral circulation  Pulm: Symmetric chest rise, non-labored respirations    Labs:  HCG Qual Urine   Date Value Ref Range Status   2020 Negative NEG^Negative Final     Comment:     This test is for screening purposes.  Results should be interpreted along with   the clinical picture.  Confirmation testing is available if warranted by   ordering [id number], HCG Quantitative Pregnancy.       hCG Urine Qualitative   Date Value Ref Range Status   2022 Negative Negative Final     Comment:     This test is for screening purposes.  Results should be interpreted along with the clinical picture.  Confirmation testing is available if warranted by ordering [id number], HCG Quantitative Pregnancy.       Assessment:  Ms. Kathy Hays is a 23 year old yo  here follow up after US was confirmatory today for failed early intrauterine pregnancy.     Plan:  # Failed pregnancy: missed AB    We discussed at length the requirements for the definitive diagnosis of a failed pregnancy as one of the following:  - Crown-rump length of 7 mm or greater and no fetal heartbeat  - Mean sac diameter of 25mm or greater with no embryo present  - Absence of embryo with heartbeat 14 or more days after a scan that showed a gestational sac without a yolk sac  - Absence of embryo with heartbeat 11 or more days after a scan that showed a gestational sac WITH a yolk sac    She meets criteria based on the following:  - Absence of embryo with heartbeat 11 or more days after a scan that showed a gestational sac WITH a yolk sac   Date of scan with gestational sac with a yolk sac: 2023   Date of follow up scan: 2023    We discussed the below options for management as well.    1. Expectant management-- after 8 weeks of expectant management, 80% of women can be expected to have passed  the pregnancy to completion. With this model, we won't be able to predict when the passing of the tissue will occur. Heavy bleeding, passage of tissue and clots as well as associated cramping is expected during the passage, but then improves. Pain medication such as tylenol and ibuprofen can help with the cramping. If at anytime you feel weak, syncopal, palpitations you should report to the nearest emergency room for evaluation.    2. Medication Management- 800mcg Vaginal cytotec can be given to facilitate passage of the pregnancy. This results in a faster timeline for passage and most patients (71%) experience complete expulsion of the failed pregnancy within 3 days of taking the medication. This number increases to up to 84% if a second dose of 800mcg vaginal cytotec is used following 48 hours of no response to the first dose. Again with this option patients will experience heavy bleeding with clots and tissue passage for a short period of time. It is important to have close surveillance of symptoms is important and recognizing when to call or present to the emergency room for evaluation.    3. Surgical Management- This would be in the form of a suction dilation and curettage. This would be performed in the operating room under anesthesia. The risks involved with this procedure include potential risk for uterine perforation or infection. You will be treated with antibiotics before the procedure to help combat seeding infection. The benefits of this procedure are definitive management of the failed pregnancy and this may be necessary if you experience bleeding beyond what is safe and expected with either of the other two options listed above.    After our discussion and review of her options, she would like to proceed with suction D&C    The total time spent during this encounter including chart review, review of ultrasounds/labs, face to face counseling and discussion of options as well as treatment planning and  documentation was 30 minutes.    Sumi Murphy MD  OB/GYN  6/13/2023 10:28 AM

## 2023-06-14 ENCOUNTER — ANESTHESIA EVENT (OUTPATIENT)
Dept: SURGERY | Facility: OTHER | Age: 23
End: 2023-06-14
Payer: COMMERCIAL

## 2023-06-15 ENCOUNTER — ANESTHESIA (OUTPATIENT)
Dept: SURGERY | Facility: OTHER | Age: 23
End: 2023-06-15
Payer: COMMERCIAL

## 2023-06-15 ENCOUNTER — HOSPITAL ENCOUNTER (OUTPATIENT)
Facility: OTHER | Age: 23
Discharge: HOME | End: 2023-06-15
Attending: STUDENT IN AN ORGANIZED HEALTH CARE EDUCATION/TRAINING PROGRAM | Admitting: STUDENT IN AN ORGANIZED HEALTH CARE EDUCATION/TRAINING PROGRAM
Payer: COMMERCIAL

## 2023-06-15 ENCOUNTER — TELEPHONE (OUTPATIENT)
Dept: OBGYN | Facility: OTHER | Age: 23
End: 2023-06-15

## 2023-06-15 VITALS
BODY MASS INDEX: 26.49 KG/M2 | OXYGEN SATURATION: 98 % | WEIGHT: 159 LBS | HEIGHT: 65 IN | SYSTOLIC BLOOD PRESSURE: 119 MMHG | DIASTOLIC BLOOD PRESSURE: 71 MMHG | HEART RATE: 81 BPM | TEMPERATURE: 98.1 F | RESPIRATION RATE: 16 BRPM

## 2023-06-15 DIAGNOSIS — G89.18 POSTOPERATIVE ABDOMINAL PAIN: Primary | ICD-10-CM

## 2023-06-15 DIAGNOSIS — R10.9 POSTOPERATIVE ABDOMINAL PAIN: Primary | ICD-10-CM

## 2023-06-15 PROCEDURE — 59820 CARE OF MISCARRIAGE: CPT | Performed by: NURSE ANESTHETIST, CERTIFIED REGISTERED

## 2023-06-15 PROCEDURE — 88305 TISSUE EXAM BY PATHOLOGIST: CPT

## 2023-06-15 PROCEDURE — 258N000003 HC RX IP 258 OP 636: Performed by: NURSE ANESTHETIST, CERTIFIED REGISTERED

## 2023-06-15 PROCEDURE — 370N000017 HC ANESTHESIA TECHNICAL FEE, PER MIN: Performed by: STUDENT IN AN ORGANIZED HEALTH CARE EDUCATION/TRAINING PROGRAM

## 2023-06-15 PROCEDURE — 360N000075 HC SURGERY LEVEL 2, PER MIN: Performed by: STUDENT IN AN ORGANIZED HEALTH CARE EDUCATION/TRAINING PROGRAM

## 2023-06-15 PROCEDURE — 710N000012 HC RECOVERY PHASE 2, PER MINUTE: Performed by: STUDENT IN AN ORGANIZED HEALTH CARE EDUCATION/TRAINING PROGRAM

## 2023-06-15 PROCEDURE — 250N000011 HC RX IP 250 OP 636: Performed by: STUDENT IN AN ORGANIZED HEALTH CARE EDUCATION/TRAINING PROGRAM

## 2023-06-15 PROCEDURE — 88342 IMHCHEM/IMCYTCHM 1ST ANTB: CPT

## 2023-06-15 PROCEDURE — 250N000009 HC RX 250: Performed by: STUDENT IN AN ORGANIZED HEALTH CARE EDUCATION/TRAINING PROGRAM

## 2023-06-15 PROCEDURE — 272N000001 HC OR GENERAL SUPPLY STERILE: Performed by: STUDENT IN AN ORGANIZED HEALTH CARE EDUCATION/TRAINING PROGRAM

## 2023-06-15 PROCEDURE — 250N000011 HC RX IP 250 OP 636: Performed by: NURSE ANESTHETIST, CERTIFIED REGISTERED

## 2023-06-15 PROCEDURE — 272N000002 HC OR SUPPLY OTHER OPNP: Performed by: STUDENT IN AN ORGANIZED HEALTH CARE EDUCATION/TRAINING PROGRAM

## 2023-06-15 PROCEDURE — 88271 CYTOGENETICS DNA PROBE: CPT

## 2023-06-15 PROCEDURE — 999N000141 HC STATISTIC PRE-PROCEDURE NURSING ASSESSMENT: Performed by: STUDENT IN AN ORGANIZED HEALTH CARE EDUCATION/TRAINING PROGRAM

## 2023-06-15 PROCEDURE — 250N000009 HC RX 250: Performed by: NURSE ANESTHETIST, CERTIFIED REGISTERED

## 2023-06-15 PROCEDURE — 59820 CARE OF MISCARRIAGE: CPT | Performed by: STUDENT IN AN ORGANIZED HEALTH CARE EDUCATION/TRAINING PROGRAM

## 2023-06-15 RX ORDER — ONDANSETRON 2 MG/ML
INJECTION INTRAMUSCULAR; INTRAVENOUS PRN
Status: DISCONTINUED | OUTPATIENT
Start: 2023-06-15 | End: 2023-06-15

## 2023-06-15 RX ORDER — ACETAMINOPHEN 325 MG/1
975 TABLET ORAL ONCE
Status: DISCONTINUED | OUTPATIENT
Start: 2023-06-15 | End: 2023-06-15 | Stop reason: HOSPADM

## 2023-06-15 RX ORDER — OXYCODONE HYDROCHLORIDE 5 MG/1
5-10 TABLET ORAL EVERY 4 HOURS PRN
Qty: 3 TABLET | Refills: 0 | Status: SHIPPED | OUTPATIENT
Start: 2023-06-15 | End: 2023-10-24

## 2023-06-15 RX ORDER — ONDANSETRON 4 MG/1
4 TABLET, ORALLY DISINTEGRATING ORAL EVERY 30 MIN PRN
Status: DISCONTINUED | OUTPATIENT
Start: 2023-06-15 | End: 2023-06-15 | Stop reason: HOSPADM

## 2023-06-15 RX ORDER — DOXYCYCLINE 100 MG/10ML
100 INJECTION, POWDER, LYOPHILIZED, FOR SOLUTION INTRAVENOUS ONCE
Status: COMPLETED | OUTPATIENT
Start: 2023-06-15 | End: 2023-06-15

## 2023-06-15 RX ORDER — NALOXONE HYDROCHLORIDE 0.4 MG/ML
0.2 INJECTION, SOLUTION INTRAMUSCULAR; INTRAVENOUS; SUBCUTANEOUS
Status: DISCONTINUED | OUTPATIENT
Start: 2023-06-15 | End: 2023-06-15 | Stop reason: HOSPADM

## 2023-06-15 RX ORDER — NALOXONE HYDROCHLORIDE 0.4 MG/ML
0.4 INJECTION, SOLUTION INTRAMUSCULAR; INTRAVENOUS; SUBCUTANEOUS
Status: DISCONTINUED | OUTPATIENT
Start: 2023-06-15 | End: 2023-06-15 | Stop reason: HOSPADM

## 2023-06-15 RX ORDER — PROPOFOL 10 MG/ML
INJECTION, EMULSION INTRAVENOUS CONTINUOUS PRN
Status: DISCONTINUED | OUTPATIENT
Start: 2023-06-15 | End: 2023-06-15

## 2023-06-15 RX ORDER — OXYCODONE HYDROCHLORIDE 5 MG/1
10 TABLET ORAL
Status: DISCONTINUED | OUTPATIENT
Start: 2023-06-15 | End: 2023-06-15 | Stop reason: HOSPADM

## 2023-06-15 RX ORDER — SODIUM CHLORIDE, SODIUM LACTATE, POTASSIUM CHLORIDE, CALCIUM CHLORIDE 600; 310; 30; 20 MG/100ML; MG/100ML; MG/100ML; MG/100ML
INJECTION, SOLUTION INTRAVENOUS CONTINUOUS
Status: DISCONTINUED | OUTPATIENT
Start: 2023-06-15 | End: 2023-06-15 | Stop reason: HOSPADM

## 2023-06-15 RX ORDER — ONDANSETRON 4 MG/1
4 TABLET, ORALLY DISINTEGRATING ORAL EVERY 8 HOURS PRN
Qty: 4 TABLET | Refills: 0 | Status: SHIPPED | OUTPATIENT
Start: 2023-06-15 | End: 2023-10-24

## 2023-06-15 RX ORDER — HYDROMORPHONE HCL IN WATER/PF 6 MG/30 ML
0.2 PATIENT CONTROLLED ANALGESIA SYRINGE INTRAVENOUS EVERY 5 MIN PRN
Status: DISCONTINUED | OUTPATIENT
Start: 2023-06-15 | End: 2023-06-15 | Stop reason: HOSPADM

## 2023-06-15 RX ORDER — HYDROMORPHONE HCL IN WATER/PF 6 MG/30 ML
0.4 PATIENT CONTROLLED ANALGESIA SYRINGE INTRAVENOUS EVERY 5 MIN PRN
Status: DISCONTINUED | OUTPATIENT
Start: 2023-06-15 | End: 2023-06-15 | Stop reason: HOSPADM

## 2023-06-15 RX ORDER — ONDANSETRON 2 MG/ML
4 INJECTION INTRAMUSCULAR; INTRAVENOUS EVERY 30 MIN PRN
Status: DISCONTINUED | OUTPATIENT
Start: 2023-06-15 | End: 2023-06-15 | Stop reason: HOSPADM

## 2023-06-15 RX ORDER — OXYCODONE HYDROCHLORIDE 5 MG/1
5 TABLET ORAL
Status: DISCONTINUED | OUTPATIENT
Start: 2023-06-15 | End: 2023-06-15 | Stop reason: HOSPADM

## 2023-06-15 RX ORDER — FENTANYL CITRATE 50 UG/ML
50 INJECTION, SOLUTION INTRAMUSCULAR; INTRAVENOUS EVERY 5 MIN PRN
Status: DISCONTINUED | OUTPATIENT
Start: 2023-06-15 | End: 2023-06-15 | Stop reason: HOSPADM

## 2023-06-15 RX ORDER — IBUPROFEN 200 MG
800 TABLET ORAL ONCE
Status: DISCONTINUED | OUTPATIENT
Start: 2023-06-15 | End: 2023-06-15 | Stop reason: HOSPADM

## 2023-06-15 RX ORDER — FENTANYL CITRATE 50 UG/ML
25 INJECTION, SOLUTION INTRAMUSCULAR; INTRAVENOUS EVERY 5 MIN PRN
Status: DISCONTINUED | OUTPATIENT
Start: 2023-06-15 | End: 2023-06-15 | Stop reason: HOSPADM

## 2023-06-15 RX ORDER — KETAMINE HYDROCHLORIDE 10 MG/ML
INJECTION INTRAMUSCULAR; INTRAVENOUS PRN
Status: DISCONTINUED | OUTPATIENT
Start: 2023-06-15 | End: 2023-06-15

## 2023-06-15 RX ORDER — PROPOFOL 10 MG/ML
INJECTION, EMULSION INTRAVENOUS PRN
Status: DISCONTINUED | OUTPATIENT
Start: 2023-06-15 | End: 2023-06-15

## 2023-06-15 RX ORDER — FENTANYL CITRATE 50 UG/ML
INJECTION, SOLUTION INTRAMUSCULAR; INTRAVENOUS PRN
Status: DISCONTINUED | OUTPATIENT
Start: 2023-06-15 | End: 2023-06-15

## 2023-06-15 RX ORDER — BUPIVACAINE HYDROCHLORIDE 2.5 MG/ML
INJECTION, SOLUTION INFILTRATION; PERINEURAL PRN
Status: DISCONTINUED | OUTPATIENT
Start: 2023-06-15 | End: 2023-06-15 | Stop reason: HOSPADM

## 2023-06-15 RX ORDER — LIDOCAINE 40 MG/G
CREAM TOPICAL
Status: DISCONTINUED | OUTPATIENT
Start: 2023-06-15 | End: 2023-06-15 | Stop reason: HOSPADM

## 2023-06-15 RX ORDER — LIDOCAINE HYDROCHLORIDE 20 MG/ML
INJECTION, SOLUTION INFILTRATION; PERINEURAL PRN
Status: DISCONTINUED | OUTPATIENT
Start: 2023-06-15 | End: 2023-06-15

## 2023-06-15 RX ADMIN — Medication 30 MG: at 07:39

## 2023-06-15 RX ADMIN — SODIUM CHLORIDE, POTASSIUM CHLORIDE, SODIUM LACTATE AND CALCIUM CHLORIDE: 600; 310; 30; 20 INJECTION, SOLUTION INTRAVENOUS at 06:52

## 2023-06-15 RX ADMIN — MIDAZOLAM HYDROCHLORIDE 2 MG: 1 INJECTION, SOLUTION INTRAMUSCULAR; INTRAVENOUS at 07:32

## 2023-06-15 RX ADMIN — DOXYCYCLINE 100 MG: 100 INJECTION, POWDER, FOR SOLUTION INTRAVENOUS at 08:25

## 2023-06-15 RX ADMIN — PROPOFOL 100 MG: 10 INJECTION, EMULSION INTRAVENOUS at 07:35

## 2023-06-15 RX ADMIN — ONDANSETRON HYDROCHLORIDE 4 MG: 2 SOLUTION INTRAMUSCULAR; INTRAVENOUS at 07:35

## 2023-06-15 RX ADMIN — PROPOFOL 125 MCG/KG/MIN: 10 INJECTION, EMULSION INTRAVENOUS at 07:35

## 2023-06-15 RX ADMIN — LIDOCAINE HYDROCHLORIDE 60 MG: 20 INJECTION, SOLUTION INFILTRATION; PERINEURAL at 07:35

## 2023-06-15 RX ADMIN — FENTANYL CITRATE 100 MCG: 50 INJECTION, SOLUTION INTRAMUSCULAR; INTRAVENOUS at 07:32

## 2023-06-15 ASSESSMENT — ACTIVITIES OF DAILY LIVING (ADL)
ADLS_ACUITY_SCORE: 35
ADLS_ACUITY_SCORE: 35

## 2023-06-15 NOTE — H&P
Surgical H&P    Chief Complaint/Reason for procedure: Suction D&C    HPI:    Kathy Hays is a 23 year old , here for missed AB. We have been watching an early pregnancy with serial US and she met criteria for a failed IUP. She desired suction D&C for treatment. She has not had any bleeding or significant cramping. No signs of infection.       Past medical history:  Past Medical History:   Diagnosis Date     Acne vulgaris      Major depression      Malformation of urachus          Ms. Hasy general medical condition is able to tolerate the procedure and anesthesia planned.    Past Surgical History:  Past Surgical History:   Procedure Laterality Date     INCISION AND DRAINAGE  2007    INCISION AND DRAINAGE,Excision of a urachus     TONSILLECTOMY, ADENOIDECTOMY, COMBINED         Medications:  Current Facility-Administered Medications   Medication     lactated ringers infusion     lidocaine (LMX4) cream     lidocaine 1 % 0.1-1 mL     sodium chloride (PF) 0.9% PF flush 3 mL     sodium chloride (PF) 0.9% PF flush 3 mL       Allergies:     No Known Allergies      Social History:  Social History     Tobacco Use     Smoking status: Former     Types: Vaping Device     Quit date: 2023     Years since quittin.4     Smokeless tobacco: Never   Vaping Use     Vaping status: Former     Substances: Nicotine, THC     Devices: Disposable   Substance Use Topics     Alcohol use: Not Currently     Comment: 1-2 drinks a week     Drug use: Yes     Types: Marijuana     Comment: Twice a day.         Family History:  Family History   Problem Relation Age of Onset     Depression Mother      Thyroid Disease Father      Cancer Maternal Grandfather         Cancer,throat         ROS:   Respiratory: No shortness of breath, dyspnea on exertion, cough, or hemoptysis  Cardiovascular: negative for palpitations, chest pain, lower extremity edema and syncope or near-syncope  Gastrointestinal: negative for, nausea,  "vomiting and hematemesis  Genitourinary: negative for, dysuria, frequency and urgency  Musculoskeletal: negative for, back pain and muscular weakness  Psychiatric: negative for, anxiety, depression and hallucinations  Hematologic/Lymphatic/Immunologic: negative for, anemia, chills and fever      Physical Exam  /83   Pulse 82   Temp 98.3  F (36.8  C) (Tympanic)   Resp 16   Ht 1.651 m (5' 5\")   Wt 72.1 kg (159 lb)   LMP 2023 (Exact Date)   SpO2 98%   BMI 26.46 kg/m    Gen: Well-appearing, no acute distress, well-groomed, alert  HEENT: Normocephalic, atraumatic  Cardiovascular: Regular rate, No peripheral edema, normal peripheral circulation  Pulm: Symmetric chest rise, non-labored respirations, clear on auscultation  Abd: Soft, non-tender, non-distended    ASA class 1      Assessment/Plan  Kathy Hays is a 23 year old  female here for the following procedure: suction D&C for missed Ab at 6 weeks.    All questions were answered and consents signed      Sandra Murphy MD on 6/15/2023 at 7:21 AM       "

## 2023-06-15 NOTE — PROGRESS NOTES
The patient was unable or refused to remove jewelry prior to their surgical procedure. The patient has been instructed on the risk of injury and possible infection. In the event jewelry or piercings are obstructing the surgical process or impeding circulation, the jewelry or piercings may need to be cut off. The surgeon and anesthesia provider have been notified that an item cannot be removed, or that the patient refuses to remove the jewelry or piercing. The surgeon and anesthesia provider will determine if it is in the patient's best interest to proceed with the procedure with the jewelry or piercings remaining in contact with the patient's body.     Earrings covered with paper tape    Ernestina Tidwell RN on 6/15/2023 at 7:42 AM

## 2023-06-15 NOTE — OP NOTE
Suction D&C Operative Note    Preop Dx:   1. Failed intrauterine pregnancy  2. Missed Ab    Postop Dx: same  Procedure: Suction dilation and curettage  Surgeon: Sumi Murphy MD    Anesthesia: MAC with local  Complications: none  EBL: 20 mL  IVF: 600 mL    Pre-Op Medications: Doxycycline IV    Findings: 6 wk sized anteverted uterus with products of conception.    Pathology: Products of conception    Procedure:   Ms. Hays was met in the pre-operative area where the procedure was once again described in detail. She had a chance to ask any remaining questions and appropriate consents for the procedure were signed/placed in her patient chart. She was then taken to the OR, placed in a dorsal-supine position where general anesthesia was administered without difficulty. She was then put in a dorsal-lithotomy position and her legs were placed in Anjel stirrups. A bimanual exam was performed and revealed a 6 week-sized anteverted uterus with normal adenexa on exam.    She was then prepped and draped in the normal sterile fashion. A bi-valve sterile spec was introduced into the vagina to visualize the cervix. A single tooth tenaculum was then applied to the anterior lip of the cervix. The uterus was gently sounded to 7 cm. The cervix was dilated using a Dennise dilators and a 7 mm suction curette was advanced gently to the uterine fundus. The suction device was then activated and the curette rotated to clear the uterus of POC. A sharp curettage was then performed until a gritty texture was noted. The suction curette was then reintroduced to clear the uterus of all remaining POC. There was minimal bleeding noted and the tenaculum was removed with good hemostasis. The uterus was bimanually massaged and found to be firm.     Ms. Hays tolerated the procedure well. She was then taken to the recovery room in stable condition. She will be discharged to home later today.     Sandra Murphy MD on 6/15/2023 at 7:58 AM

## 2023-06-15 NOTE — ANESTHESIA CARE TRANSFER NOTE
Patient: Kathy Hays    Procedure: Procedure(s):  DILATION AND CURETTAGE, UTERUS, USING SUCTION       Diagnosis: Missed ab [O02.1]  Diagnosis Additional Information: No value filed.    Anesthesia Type:   MAC     Note:    Oropharynx: oropharynx clear of all foreign objects  Level of Consciousness: awake  Oxygen Supplementation: room air    Independent Airway: airway patency satisfactory and stable  Dentition: dentition unchanged  Vital Signs Stable: post-procedure vital signs reviewed and stable  Report to RN Given: handoff report given  Patient transferred to: Phase II    Handoff Report: Identifed the Patient, Identified the Reponsible Provider, Reviewed the pertinent medical history, Discussed the surgical course, Reviewed Intra-OP anesthesia mangement and issues during anesthesia, Set expectations for post-procedure period and Allowed opportunity for questions and acknowledgement of understanding      Vitals:  Vitals Value Taken Time   BP     Temp     Pulse     Resp     SpO2         Electronically Signed By: DANI ZHAO [facility]  Zoey 15, 2023  8:04 AM

## 2023-06-15 NOTE — ANESTHESIA POSTPROCEDURE EVALUATION
Patient: Kathy Hasy    Procedure: Procedure(s):  DILATION AND CURETTAGE, UTERUS, USING SUCTION       Anesthesia Type:  MAC    Note:  Disposition: Outpatient   Postop Pain Control: Uneventful            Sign Out: Well controlled pain   PONV: No   Neuro/Psych: Uneventful            Sign Out: Acceptable/Baseline neuro status   Airway/Respiratory: Uneventful            Sign Out: Acceptable/Baseline resp. status   CV/Hemodynamics: Uneventful            Sign Out: Acceptable CV status; No obvious hypovolemia; No obvious fluid overload   Other NRE: NONE   DID A NON-ROUTINE EVENT OCCUR? No           Last vitals:  Vitals Value Taken Time   /71 06/15/23 0945   Temp 98.1  F (36.7  C) 06/15/23 0801   Pulse 81 06/15/23 0945   Resp 16 06/15/23 0945   SpO2 98 % 06/15/23 0945   Vitals shown include unvalidated device data.    Electronically Signed By: DANI ZHAO CRNA  Zoey 15, 2023  1:14 PM

## 2023-06-15 NOTE — LETTER
[facility]  [address]  [place] [address]  Phone: [phone]  Fax: [phone]    Zoey 15, 2023            To whom it may concern:      Please excuse [place] from work from Thursday 6/15-Sunday 6/18 due to a medical concern.    Please contact me for questions or concerns.      Sincerely,      Sandra Murphy MD

## 2023-06-15 NOTE — ANESTHESIA PREPROCEDURE EVALUATION
Anesthesia Pre-Procedure Evaluation    Patient: Kathy Hays   MRN: [id number] : 2000        Procedure : Procedure(s):  DILATION AND CURETTAGE, UTERUS, USING SUCTION          Past Medical History:   Diagnosis Date     Acne vulgaris      Major depression      Malformation of urachus           Past Surgical History:   Procedure Laterality Date     INCISION AND DRAINAGE  2007    INCISION AND DRAINAGE,Excision of a urachus     TONSILLECTOMY, ADENOIDECTOMY, COMBINED        No Known Allergies   Social History     Tobacco Use     Smoking status: Former     Types: Vaping Device     Quit date: 2023     Years since quittin.4     Smokeless tobacco: Never   Vaping Use     Vaping status: Former     Substances: Nicotine, THC     Devices: Disposable   Substance Use Topics     Alcohol use: Not Currently     Comment: 1-2 drinks a week      Wt Readings from Last 1 Encounters:   06/15/23 72.1 kg (159 lb)        Anesthesia Evaluation   Pt has had prior anesthetic.     No history of anesthetic complications       ROS/MED HX  ENT/Pulmonary:  - neg pulmonary ROS     Neurologic:  - neg neurologic ROS     Cardiovascular:  - neg cardiovascular ROS     METS/Exercise Tolerance: >4 METS    Hematologic:  - neg hematologic  ROS     Musculoskeletal:  - neg musculoskeletal ROS     GI/Hepatic:     (+) GERD,     Renal/Genitourinary:  - neg Renal ROS     Endo:  - neg endo ROS     Psychiatric/Substance Use:     (+) psychiatric history depression Recreational drug usage: Cannabis.    Infectious Disease:  - neg infectious disease ROS     Malignancy:  - neg malignancy ROS     Other:  - neg other ROS          Physical Exam    Airway  airway exam normal      Mallampati: II   TM distance: > 3 FB   Neck ROM: full   Mouth opening: > 3 cm    Respiratory Devices and Support         Dental       (+) Completely normal teeth      Cardiovascular   cardiovascular exam normal       Rhythm and rate: regular and normal     Pulmonary    pulmonary exam normal        breath sounds clear to auscultation           OUTSIDE LABS:  CBC:   Lab Results   Component Value Date    WBC 14.4 (H) 12/28/2022    WBC 9.2 11/12/2022    HGB 15.0 12/28/2022    HGB 13.1 11/12/2022    HCT 42.7 12/28/2022    HCT 37.9 11/12/2022     12/28/2022     11/12/2022     BMP:   Lab Results   Component Value Date     12/28/2022     11/12/2022    POTASSIUM 3.9 12/28/2022    POTASSIUM 3.9 11/12/2022    CHLORIDE 103 12/28/2022    CHLORIDE 104 11/12/2022    CO2 22 12/28/2022    CO2 24 11/12/2022    BUN 12.8 12/28/2022    BUN 7.0 11/12/2022    CR 0.54 12/28/2022    CR 0.71 11/12/2022     (H) 12/28/2022    GLC 98 11/12/2022     COAGS: No results found for: PTT, INR, FIBR  POC:   Lab Results   Component Value Date    HCG Negative 12/28/2022     HEPATIC:   Lab Results   Component Value Date    ALBUMIN 4.7 12/28/2022    PROTTOTAL 8.2 12/28/2022    ALT 11 12/28/2022    AST 16 12/28/2022    ALKPHOS 63 12/28/2022    BILITOTAL 0.8 12/28/2022     OTHER:   Lab Results   Component Value Date    MICHELLE 9.5 12/28/2022    MAG 1.8 12/28/2022    LIPASE 33 11/03/2021       Anesthesia Plan    ASA Status:  2   NPO Status:  NPO Appropriate    Anesthesia Type: MAC.   Induction: Propofol.   Maintenance: Balanced.        Consents    Anesthesia Plan(s) and associated risks, benefits, and realistic alternatives discussed. Questions answered and patient/representative(s) expressed understanding.     - Discussed: Risks, Benefits and Alternatives for BOTH SEDATION and the PROCEDURE were discussed     - Discussed with:  Patient      - Extended Intubation/Ventilatory Support Discussed: No.      - Patient is DNR/DNI Status: No    Use of blood products discussed: No .     Postoperative Care    Pain management: IV analgesics, Multi-modal analgesia.   PONV prophylaxis: Ondansetron (or other 5HT-3)     Comments:                DANI ZHAO CRNA

## 2023-06-15 NOTE — OR NURSING
Kathy has been discharged to home at 1000 via ambulatory accompanied by boyfriend Gabi (mom), SINA Do    Written discharge instructions were provided to patient.  Prescriptions were e-scribed.  Patient states their pain is 1 out of 10, and denies any nausea or dizziness upon discharge.    Patient and adult caring for them verbalize understanding of discharge instructions including no driving until tomorrow and no longer taking narcotic pain medications - no operating mechanical equipment and no making any important decisions.They understand reason for discharge, and necessary follow-up appointments.      Ernestina Tidwell RN on 6/15/2023 at 10:27 AM

## 2023-06-15 NOTE — TELEPHONE ENCOUNTER
Spoke with patient. She is passed a nickel size clot post D&C today. Patient is not filling pads with blood.     Patient will call back if:    Her pain is not controlled by pain medication or pain suddenly  increases.  She develops a fever greater than 101 and/or chills 24 hours after  surgery.  She notices redness, swelling, or bad smelling drainage.  If she has a large amount of bleeding that does not stop.  If she is unable to pass urine within 8 hours after surgery.  If she has vomiting that lasts more than a day.  If she has a skin reaction to tape or dressing (such as redness, itching,  or rash at the surgery site).  If she soaks a maxi pad every hour for more than two hours.    Gabi Zamora RN on 6/15/2023 at 4:56 PM

## 2023-06-15 NOTE — DISCHARGE INSTRUCTIONS
[facility]  Adult Discharge Orders & Instructions      For 24 hours after surgery:  Get plenty of rest.  A responsible adult must stay with you for at least 24 hours after you leave the hospital.   You may feel lightheaded.  IF so, sit for a few minutes before standing.  Have someone help you get up.   You may have a slight fever. Call the doctor if your fever is over 101 F (38.3 C) (taken under the tongue) or lasts longer than 24 hours.  You may have a dry mouth, a sore throat, muscle aches or trouble sleeping.  These should go away after 24 hours.  Do not make important or legal decisions.  6.   Do not drive or use heavy equipment.  If you have weakness or tingling, don't drive or use heavy equipment until this feeling goes away.  To contact a doctor, call    [phone]______________

## 2023-06-17 VITALS
DIASTOLIC BLOOD PRESSURE: 75 MMHG | HEIGHT: 65 IN | SYSTOLIC BLOOD PRESSURE: 139 MMHG | HEART RATE: 83 BPM | RESPIRATION RATE: 16 BRPM | BODY MASS INDEX: 26.33 KG/M2 | OXYGEN SATURATION: 99 % | WEIGHT: 158 LBS | TEMPERATURE: 99.9 F

## 2023-06-17 LAB
ABO/RH(D): NORMAL
ANTIBODY SCREEN: NEGATIVE
SPECIMEN EXPIRATION DATE: NORMAL

## 2023-06-17 PROCEDURE — 99284 EMERGENCY DEPT VISIT MOD MDM: CPT | Mod: 25

## 2023-06-17 PROCEDURE — 99284 EMERGENCY DEPT VISIT MOD MDM: CPT | Performed by: STUDENT IN AN ORGANIZED HEALTH CARE EDUCATION/TRAINING PROGRAM

## 2023-06-18 ENCOUNTER — APPOINTMENT (OUTPATIENT)
Dept: ULTRASOUND IMAGING | Facility: OTHER | Age: 23
End: 2023-06-18
Attending: STUDENT IN AN ORGANIZED HEALTH CARE EDUCATION/TRAINING PROGRAM
Payer: COMMERCIAL

## 2023-06-18 ENCOUNTER — HOSPITAL ENCOUNTER (EMERGENCY)
Facility: OTHER | Age: 23
Discharge: HOME | End: 2023-06-18
Attending: STUDENT IN AN ORGANIZED HEALTH CARE EDUCATION/TRAINING PROGRAM | Admitting: STUDENT IN AN ORGANIZED HEALTH CARE EDUCATION/TRAINING PROGRAM
Payer: COMMERCIAL

## 2023-06-18 LAB
ANION GAP SERPL CALCULATED.3IONS-SCNC: 11 MMOL/L (ref 7–15)
BASOPHILS # BLD AUTO: 0.1 10E3/UL (ref 0–0.2)
BASOPHILS NFR BLD AUTO: 1 %
BUN SERPL-MCNC: 6.5 MG/DL (ref 6–20)
CALCIUM SERPL-MCNC: 9.5 MG/DL (ref 8.6–10)
CHLORIDE SERPL-SCNC: 100 MMOL/L (ref 98–107)
CREAT SERPL-MCNC: 0.49 MG/DL (ref 0.51–0.95)
DEPRECATED HCO3 PLAS-SCNC: 24 MMOL/L (ref 22–29)
EOSINOPHIL # BLD AUTO: 0.2 10E3/UL (ref 0–0.7)
EOSINOPHIL NFR BLD AUTO: 2 %
ERYTHROCYTE [DISTWIDTH] IN BLOOD BY AUTOMATED COUNT: 12 % (ref 10–15)
GFR SERPL CREATININE-BSD FRML MDRD: >90 ML/MIN/1.73M2
GLUCOSE SERPL-MCNC: 93 MG/DL (ref 70–99)
HCT VFR BLD AUTO: 38.6 % (ref 35–47)
HGB BLD-MCNC: 13.4 G/DL (ref 11.7–15.7)
IMM GRANULOCYTES # BLD: 0.1 10E3/UL
IMM GRANULOCYTES NFR BLD: 0 %
LYMPHOCYTES # BLD AUTO: 1.6 10E3/UL (ref 0.8–5.3)
LYMPHOCYTES NFR BLD AUTO: 14 %
MCH RBC QN AUTO: 30.5 PG (ref 26.5–33)
MCHC RBC AUTO-ENTMCNC: 34.7 G/DL (ref 31.5–36.5)
MCV RBC AUTO: 88 FL (ref 78–100)
MONOCYTES # BLD AUTO: 1 10E3/UL (ref 0–1.3)
MONOCYTES NFR BLD AUTO: 9 %
NEUTROPHILS # BLD AUTO: 8.3 10E3/UL (ref 1.6–8.3)
NEUTROPHILS NFR BLD AUTO: 74 %
NRBC # BLD AUTO: 0 10E3/UL
NRBC BLD AUTO-RTO: 0 /100
PLATELET # BLD AUTO: 242 10E3/UL (ref 150–450)
POTASSIUM SERPL-SCNC: 3.8 MMOL/L (ref 3.4–5.3)
RBC # BLD AUTO: 4.4 10E6/UL (ref 3.8–5.2)
SODIUM SERPL-SCNC: 135 MMOL/L (ref 136–145)
WBC # BLD AUTO: 11.2 10E3/UL (ref 4–11)

## 2023-06-18 PROCEDURE — 86901 BLOOD TYPING SEROLOGIC RH(D): CPT | Performed by: STUDENT IN AN ORGANIZED HEALTH CARE EDUCATION/TRAINING PROGRAM

## 2023-06-18 PROCEDURE — 80048 BASIC METABOLIC PNL TOTAL CA: CPT | Performed by: STUDENT IN AN ORGANIZED HEALTH CARE EDUCATION/TRAINING PROGRAM

## 2023-06-18 PROCEDURE — 76830 TRANSVAGINAL US NON-OB: CPT | Mod: TC

## 2023-06-18 PROCEDURE — 86850 RBC ANTIBODY SCREEN: CPT | Performed by: STUDENT IN AN ORGANIZED HEALTH CARE EDUCATION/TRAINING PROGRAM

## 2023-06-18 PROCEDURE — 250N000013 HC RX MED GY IP 250 OP 250 PS 637: Performed by: STUDENT IN AN ORGANIZED HEALTH CARE EDUCATION/TRAINING PROGRAM

## 2023-06-18 PROCEDURE — 85025 COMPLETE CBC W/AUTO DIFF WBC: CPT | Performed by: STUDENT IN AN ORGANIZED HEALTH CARE EDUCATION/TRAINING PROGRAM

## 2023-06-18 PROCEDURE — 36415 COLL VENOUS BLD VENIPUNCTURE: CPT | Performed by: STUDENT IN AN ORGANIZED HEALTH CARE EDUCATION/TRAINING PROGRAM

## 2023-06-18 RX ADMIN — IBUPROFEN 600 MG: 200 TABLET, FILM COATED ORAL at 02:47

## 2023-06-18 ASSESSMENT — ACTIVITIES OF DAILY LIVING (ADL)
ADLS_ACUITY_SCORE: 35
ADLS_ACUITY_SCORE: 35

## 2023-06-18 NOTE — DISCHARGE INSTRUCTIONS
Like we discussed, like you to call Dr. Ybarra's office later today to schedule appointment within the next couple days for reevaluation.  Return to the emergency department between now and then if you develop fevers, abdominal pain, burning or discomfort with urination, significantly worsening bleeding, or vaginal discharge other than blood.

## 2023-06-18 NOTE — ED PROVIDER NOTES
Emergency Department Provider Note  : 2000 Age: 23 year old Sex: female MRN: [id number]    Chief Complaint   Patient presents with     Vaginal Bleeding     Back Pain       Medical Decision Making / Assessment / Plan   23 year old female with a PMH of recent suction D&C for missed  on the 15 of this month who presents for evaluation of ongoing vaginal bleeding, low back pain, and borderline fever on presentation to 37.7.  Abdominal exam is quite benign with no reproducible pain.  No systemic symptoms and no vaginal discharge other than blood.  Differential diagnosis is broad but includes endometritis and cramping in the context of retained products and recent D&C, less likely uterine rupture given lack of abdominal pain, others.      Minimal white count elevation.  Hemoglobin is stable despite the patient's description of these amount of bleeding.    ED Course as of 23 0527   Sun 2023   0237 WBC(!): 11.2   0237 Hemoglobin: 13.4   0238 Creatinine(!): 0.49   0238 Sodium(!): 135   0238 Potassium: 3.8   0238 Chloride: 100   0238 Carbon Dioxide (CO2): 24   0238 Anion Gap: 11   0238 ABO/Rh(D): AB POS     Ultrasound shows retained blood products without other acute complication noted.  Discussed patient with Dr. Johnson of OB/GYN.  Plan will be for the patient to call Dr. Ybarra's office later today to schedule an appointment for evaluation.  We will give strict return precautions for signs or symptoms of developing endometritis. Pt in agreement.    The patient was informed of the plan and verbalized understanding and agreed with the plan. The patient was given strict return to Emergency Department precautions as well as appropriate follow up instructions. The patient was discharged in stable condition.    New Prescriptions    No medications on file       Final diagnoses:   Retained products of conception with hemorrhage       Keith Betancur MD  2023   Emergency Department    Subjective  "  Kathy is a 23 year old female with PMH of recent missed  status post suction D&C on the  of this month just a few days ago who presents at  1:25 AM for evaluation of ongoing vaginal bleeding, passage of clots, now development of low back pain which she describes as a persistent ache.  She describes passing 9 fairly large clots today which is a significant change from several days ago.  Denies fevers or chills.  No abdominal pain.  Denies vaginal discharge other than bleeding.  No nausea or vomiting.    I have reviewed the Medications, Allergies, Past Medical and Surgical History, and Social History in the [facility] and with family.    Review of Systems:  Please see HPI for pertinent positives and negatives. All other systems reviewed and found to be negative.      Objective   BP: 139/75  Pulse: 83  Temp: 99.9  F (37.7  C)  Resp: 16  Height: 165.1 cm (5' 5\")  Weight: 71.7 kg (158 lb)  SpO2: 99 %    Physical Exam:   Gen: Comfortable. NAD  HEENT: MMM. AT/NC.  Eye: EOMI.   CV: Well perfused.   Pulm: Nonlabored, equal chest rise  Abd:  soft and nondistended.  Nontender to palpation.  Ext: No significant edema.  Neuro: AOx3, no focal deficit noted  Psych: Appropriate affect, cognition intact    Procedures / Critical Care   Procedures    Critical Care Time: none         Medical/Surgical History:  Past Medical History:   Diagnosis Date     Acne vulgaris      Major depression      Malformation of urachus          Past Surgical History:   Procedure Laterality Date     DILATION AND CURETTAGE SUCTION N/A 6/15/2023    Procedure: DILATION AND CURETTAGE, UTERUS, USING SUCTION;  Surgeon: Sandra Murphy MD;  Location:  OR     INCISION AND DRAINAGE  2007    INCISION AND DRAINAGE,Excision of a urachus     TONSILLECTOMY, ADENOIDECTOMY, COMBINED         Medications:  No current facility-administered medications for this encounter.     Current Outpatient Medications   Medication     ondansetron " (ZOFRAN ODT) 4 MG ODT tab     oxyCODONE (ROXICODONE) 5 MG tablet       Allergies:  Patient has no known allergies.    Relevant labs, images, EKGs, Epic and outside hospital (if applicable) charts were reviewed. The findings, diagnosis, plan, and need for follow up were discussed with the patient/family. Nursing notes were reviewed.      Keith Betancur MD  06/18/23 0528

## 2023-06-18 NOTE — ED TRIAGE NOTES
Pt arrived to ER with complaints of vaginal bleeding since 1430.  Pt states she had a D&C on Thursday am.  Pt states the clots are the size of the palm of her hand is concerned.  Also complaining of low back pain and constipation after taking the oxycodone.        Triage Assessment     Row Name 06/17/23 2039       Triage Assessment (Adult)    Airway WDL WDL       Respiratory WDL    Respiratory WDL WDL       Skin Circulation/Temperature WDL    Skin Circulation/Temperature WDL WDL       Cardiac WDL    Cardiac WDL WDL       Peripheral/Neurovascular WDL    Peripheral Neurovascular WDL WDL       Cognitive/Neuro/Behavioral WDL    Cognitive/Neuro/Behavioral WDL WDL

## 2023-06-19 ENCOUNTER — OFFICE VISIT (OUTPATIENT)
Dept: OBGYN | Facility: OTHER | Age: 23
End: 2023-06-19
Attending: STUDENT IN AN ORGANIZED HEALTH CARE EDUCATION/TRAINING PROGRAM
Payer: COMMERCIAL

## 2023-06-19 VITALS — SYSTOLIC BLOOD PRESSURE: 110 MMHG | TEMPERATURE: 99.7 F | DIASTOLIC BLOOD PRESSURE: 60 MMHG | HEART RATE: 72 BPM

## 2023-06-19 DIAGNOSIS — O02.1 MISSED AB: Primary | ICD-10-CM

## 2023-06-19 LAB — HCG INTACT+B SERPL-ACNC: 1125 MIU/ML

## 2023-06-19 PROCEDURE — 36415 COLL VENOUS BLD VENIPUNCTURE: CPT | Mod: ZL | Performed by: STUDENT IN AN ORGANIZED HEALTH CARE EDUCATION/TRAINING PROGRAM

## 2023-06-19 PROCEDURE — 99213 OFFICE O/P EST LOW 20 MIN: CPT | Performed by: STUDENT IN AN ORGANIZED HEALTH CARE EDUCATION/TRAINING PROGRAM

## 2023-06-19 PROCEDURE — 84702 CHORIONIC GONADOTROPIN TEST: CPT | Mod: ZL | Performed by: STUDENT IN AN ORGANIZED HEALTH CARE EDUCATION/TRAINING PROGRAM

## 2023-06-19 RX ORDER — MISOPROSTOL 200 UG/1
800 TABLET ORAL ONCE
Qty: 4 TABLET | Refills: 0 | Status: SHIPPED | OUTPATIENT
Start: 2023-06-19 | End: 2023-06-19

## 2023-06-19 RX ORDER — OXYCODONE HYDROCHLORIDE 5 MG/1
2.5 TABLET ORAL EVERY 6 HOURS PRN
Qty: 2 TABLET | Refills: 0 | Status: SHIPPED | OUTPATIENT
Start: 2023-06-19 | End: 2023-10-24

## 2023-06-19 ASSESSMENT — PAIN SCALES - GENERAL: PAINLEVEL: SEVERE PAIN (7)

## 2023-06-19 NOTE — LETTER
June 19, 2023          To Whom It May Concern:    Please excuse [place] from work June 20-23 due to medical procedure and care following. Thank you for your understanding.    Sincerely,        Sandra Murphy MD

## 2023-06-19 NOTE — LETTER
June 19, 2023      Kathy Hays  [address] [facility] [address]  [place] [address]        To Whom It May Concern:    Kathy Hays was seen in our clinic. She needs to have the following days off for medical treatment: June 19-23. Thank you for your understanding.    Sincerely,        Sandra Murphy MD

## 2023-06-19 NOTE — NURSING NOTE
"Chief Complaint   Patient presents with     Follow Up     Follow up on vaginal bleeding.   Initial /60   Pulse 72   Temp 99.7  F (37.6  C) (Oral)   LMP 06/16/2023 (Exact Date)   Breastfeeding No  Estimated body mass index is 26.29 kg/m  as calculated from the following:    Height as of 6/17/23: 1.651 m (5' 5\").    Weight as of 6/17/23: 71.7 kg (158 lb).  Medication Reconciliation: complete    Mei Tang LPN  "

## 2023-06-19 NOTE — PROGRESS NOTES
Follow-Up Visit    S: Ms. Kathy Hays is a 23 year old  here for follow up after suction D&C. She notes she had some bleeding over the weekend. She had associated clots. On Saturday she went to the ER for evaluation and an US was performed. The IUP has been removed from the surgery, and it appears to have some residual blood clot vs. Retained products present. There is no internal blood flow, more suggestive of blood clot.    Yesterday the bleeding was a lot lighter, and today the bleeding only seems to be when she has to urinate for the first time in the morning. She has lower abdominal pain and constant aching along her back. She denies fevers, chills.     We reviewed options including expectant management with close observation, cytotec vaginal dose with follow up US or returning to the operating room. We decided on a dose of cytotec vaginally, follow up US on Wednesday and plan for repeat suction D&C if lining remains thicker.    O:  /60   Pulse 72   Temp 99.7  F (37.6  C) (Oral)   LMP 2023 (Exact Date)   Breastfeeding No   Gen: Well-appearing, NAD  Pulm: nonlabored  Abd: Soft, non-tender, non-distended  Ext: No LE edema, extremities warm and well perfused    Pelvic:  Deferred as she has improvement in symptoms, see plan below    A/P:  Ms. Kathy Hays is a 23 year old  here for follow up after suction d&C, bleeding following procedure related to trapped intrauterine blood clot vs. Small amount of retained products.    - Options discussed as above, plan for cytotec  mcg tomorrow  - Plan for follow up US on Wednesday: if endometrial stripe is not thinner and resolved, will plan for repeat suction D&C  - Pathology pending from first D&C to assess for molar pregnancy etc.  - Return precautions discussed in detail    Rx:  800 mcg cytotec  2 tablets 5 mg oxycodone: plans to only take 2.5 mg at a time due to pain relief with that dose after surgery      Answers for HPI/ROS  submitted by the patient on 6/19/2023  What is the reason for your visit today? : post OP concerns  How many servings of fruits and vegetables do you eat daily?: 0-1  On average, how many sweetened beverages do you drink each day (Examples: soda, juice, sweet tea, etc.  Do NOT count diet or artificially sweetened beverages)?: 4  How many minutes a day do you exercise enough to make your heart beat faster?: 60 or more  How many days a week do you exercise enough to make your heart beat faster?: 4  How many days per week do you miss taking your medication?: 0      Total amount of time spent during today's encounter including chart prep, face to face, documentation was 20 minutes  Sandra Murphy MD on 6/19/2023 at 4:39 PM

## 2023-06-21 ENCOUNTER — OFFICE VISIT (OUTPATIENT)
Dept: OBGYN | Facility: OTHER | Age: 23
End: 2023-06-21
Attending: STUDENT IN AN ORGANIZED HEALTH CARE EDUCATION/TRAINING PROGRAM
Payer: COMMERCIAL

## 2023-06-21 ENCOUNTER — HOSPITAL ENCOUNTER (OUTPATIENT)
Dept: ULTRASOUND IMAGING | Facility: OTHER | Age: 23
Discharge: HOME | End: 2023-06-21
Attending: STUDENT IN AN ORGANIZED HEALTH CARE EDUCATION/TRAINING PROGRAM
Payer: COMMERCIAL

## 2023-06-21 VITALS
SYSTOLIC BLOOD PRESSURE: 122 MMHG | DIASTOLIC BLOOD PRESSURE: 70 MMHG | WEIGHT: 161.5 LBS | HEART RATE: 94 BPM | BODY MASS INDEX: 26.88 KG/M2

## 2023-06-21 DIAGNOSIS — O02.1 MISSED AB: ICD-10-CM

## 2023-06-21 DIAGNOSIS — O02.1 MISSED AB: Primary | ICD-10-CM

## 2023-06-21 LAB
ERYTHROCYTE [DISTWIDTH] IN BLOOD BY AUTOMATED COUNT: 11.9 % (ref 10–15)
HCG INTACT+B SERPL-ACNC: 669 MIU/ML
HCT VFR BLD AUTO: 38.2 % (ref 35–47)
HGB BLD-MCNC: 13.2 G/DL (ref 11.7–15.7)
MCH RBC QN AUTO: 30.9 PG (ref 26.5–33)
MCHC RBC AUTO-ENTMCNC: 34.6 G/DL (ref 31.5–36.5)
MCV RBC AUTO: 90 FL (ref 78–100)
PLATELET # BLD AUTO: 260 10E3/UL (ref 150–450)
RBC # BLD AUTO: 4.27 10E6/UL (ref 3.8–5.2)
WBC # BLD AUTO: 8.4 10E3/UL (ref 4–11)

## 2023-06-21 PROCEDURE — 36415 COLL VENOUS BLD VENIPUNCTURE: CPT | Mod: ZL | Performed by: STUDENT IN AN ORGANIZED HEALTH CARE EDUCATION/TRAINING PROGRAM

## 2023-06-21 PROCEDURE — 85027 COMPLETE CBC AUTOMATED: CPT | Mod: ZL | Performed by: STUDENT IN AN ORGANIZED HEALTH CARE EDUCATION/TRAINING PROGRAM

## 2023-06-21 PROCEDURE — 76830 TRANSVAGINAL US NON-OB: CPT

## 2023-06-21 PROCEDURE — 99213 OFFICE O/P EST LOW 20 MIN: CPT | Performed by: STUDENT IN AN ORGANIZED HEALTH CARE EDUCATION/TRAINING PROGRAM

## 2023-06-21 PROCEDURE — 84702 CHORIONIC GONADOTROPIN TEST: CPT | Mod: ZL | Performed by: STUDENT IN AN ORGANIZED HEALTH CARE EDUCATION/TRAINING PROGRAM

## 2023-06-21 NOTE — PROGRESS NOTES
Follow-Up Visit    S: Ms. Kathy Hays is a 23 year old  here for follow up after missed AB. A few days after her procedure she presented to the emergency room with new onset vaginal bleeding with associated clots.  An ultrasound was performed at that time which revealed any heterogenous endometrium measuring 1.7 cm without significant blood flow in the center of heterogeneity.  She had a follow-up visit the next day in clinic which revealed a quant of 1500.  We discussed the options and proceeded with Cytotec vaginally to help pass the remaining clot and any potential retained products of conception.    Yesterday she used the Cytotec and notes that she has had some increased cramping and some more passage of small clots the bleeding has lightened.  US this morning shows an endometrial stripe now down to 1.0 cm from 1.7 cm two days ago- there is a clot that has moved down into the endocervical canal.    Reviewed that Hgb is stable, no signs of infection.     Pathology is still pending from her procedure last week.    O:  /70   Pulse 94   Wt 73.3 kg (161 lb 8 oz)   LMP 2023 (Exact Date)   Breastfeeding No   BMI 26.88 kg/m    Gen: Well-appearing, NAD  Pulm: nonlabored  Pelvic: Politely declined as she feels she is doing better  Blood type is Rh+      A/P:  Ms. Kathy Hays is a 23 year old  here for follow up after suction d&C, bleeding following procedure related to trapped intrauterine blood clot vs. Small amount of retained products.  She has not completed a dose of vaginal Cytotec and notes that she is feeling better.    Ultrasound shows improved endometrial stripe with possible small amount of clot remaining in the cavity.    Discussed that we should trend hCG values to ensure that it continues to downfall and close interval follow-up scheduled for next week as well.    We discussed that if pathology were to return as molar pregnancy she would require additional treatment in  the form of methotrexate and potentially another suction D&C.    We discussed return precautions including signs of infection, or return of heavy hemorrhage.    We will monitor time spent during today's encounter including chart prep, face-to-face, review of ultrasound, counseling on plan and documentation was 25 minutes.

## 2023-06-21 NOTE — NURSING NOTE
Pt presents to clinic today for follow up ultrasound.      Medication Reconciliation: complete  Kathy Law LPN

## 2023-06-23 ENCOUNTER — TELEPHONE (OUTPATIENT)
Dept: LAB | Facility: OTHER | Age: 23
End: 2023-06-23
Payer: COMMERCIAL

## 2023-06-23 DIAGNOSIS — Z32.01 PREGNANCY TEST POSITIVE: Primary | ICD-10-CM

## 2023-06-23 NOTE — PROGRESS NOTES
Patient coming in for a Lab appointment on 6-29-23. Appointment note stats that the patient is coming in for Marianna Lo labs. Please place orders.    Vick Cao, CLA [id number] on 6-23-23.

## 2023-06-27 LAB
PATH REPORT.COMMENTS IMP SPEC: NORMAL
PATH REPORT.FINAL DX SPEC: NORMAL
PATH REPORT.RELEVANT HX SPEC: NORMAL
PHOTO IMAGE: NORMAL

## 2023-06-29 ENCOUNTER — OFFICE VISIT (OUTPATIENT)
Dept: OBGYN | Facility: OTHER | Age: 23
End: 2023-06-29
Payer: COMMERCIAL

## 2023-06-29 ENCOUNTER — LAB (OUTPATIENT)
Dept: LAB | Facility: OTHER | Age: 23
End: 2023-06-29
Attending: FAMILY MEDICINE
Payer: COMMERCIAL

## 2023-06-29 VITALS
SYSTOLIC BLOOD PRESSURE: 106 MMHG | OXYGEN SATURATION: 96 % | HEART RATE: 92 BPM | DIASTOLIC BLOOD PRESSURE: 78 MMHG | BODY MASS INDEX: 27.11 KG/M2 | WEIGHT: 162.9 LBS

## 2023-06-29 DIAGNOSIS — Z32.01 PREGNANCY TEST POSITIVE: ICD-10-CM

## 2023-06-29 DIAGNOSIS — O02.1 MISSED AB: Primary | ICD-10-CM

## 2023-06-29 LAB — HCG INTACT+B SERPL-ACNC: 56 MIU/ML

## 2023-06-29 PROCEDURE — 99024 POSTOP FOLLOW-UP VISIT: CPT

## 2023-06-29 PROCEDURE — 84702 CHORIONIC GONADOTROPIN TEST: CPT | Mod: ZL

## 2023-06-29 PROCEDURE — 36415 COLL VENOUS BLD VENIPUNCTURE: CPT | Mod: ZL

## 2023-06-29 ASSESSMENT — PAIN SCALES - GENERAL: PAINLEVEL: NO PAIN (0)

## 2023-06-29 ASSESSMENT — PATIENT HEALTH QUESTIONNAIRE - PHQ9
SUM OF ALL RESPONSES TO PHQ QUESTIONS 1-9: 0
SUM OF ALL RESPONSES TO PHQ QUESTIONS 1-9: 0
10. IF YOU CHECKED OFF ANY PROBLEMS, HOW DIFFICULT HAVE THESE PROBLEMS MADE IT FOR YOU TO DO YOUR WORK, TAKE CARE OF THINGS AT HOME, OR GET ALONG WITH OTHER PEOPLE: NOT DIFFICULT AT ALL

## 2023-06-29 NOTE — PROGRESS NOTES
Postoperative Visit  2023    S: Kathy Hays is a 23 year old  here for post-operative visit following suction D&C on 6/15/2023 she reports feeling well.  She reports that she still does have some bleeding vaginally.  She notes that she will intermittently have small pieces of tissue past.  She reports that she has no pain at home and she is doing very well.  No vaginal concerns.  HCG was completed today prior to visit.      O:   /78   Pulse 92   Wt 73.9 kg (162 lb 14.4 oz)   LMP 2023 (Exact Date)   SpO2 96%   Breastfeeding No   BMI 27.11 kg/m    Gen:  Well-appearing, NAD  Pelvic: Deferred today    A/P:   Ms. Kathy Hays is a 23 year old  2 weeks s/p suction D&C. Doing well, no concerns.  Pathology has returned and was stated to essentially rule out partial and complete molar pregnancy.  Patient was reassured with these findings.  We discussed that we will need to follow hCG quantitative level down to to ensure that it continues to fall.  Today hCG is pending but if hCG were to have risen we will consult with one of the OB/GYN's in office today.  Discussed that today that she can resume using tampons.  She is happy with this.  Return precautions discussed and patient voices understanding she will follow-up as needed.    Marianna LOUISE, OMAYRAP-C  2023 11:29 AM

## 2023-06-29 NOTE — NURSING NOTE
Patient presents to clinic for post op follow up for D&C  /78   Pulse 92   Wt 73.9 kg (162 lb 14.4 oz)   LMP 06/16/2023 (Exact Date)   SpO2 96%   Breastfeeding No   BMI 27.11 kg/m     Ana Luisa Law LPN on 6/29/2023 at 10:58 AM

## 2023-07-30 ENCOUNTER — HEALTH MAINTENANCE LETTER (OUTPATIENT)
Age: 23
End: 2023-07-30

## 2023-08-22 ENCOUNTER — PATIENT OUTREACH (OUTPATIENT)
Dept: FAMILY MEDICINE | Facility: OTHER | Age: 23
End: 2023-08-22
Payer: COMMERCIAL

## 2023-08-22 NOTE — LETTER
[facility]  [address]  [place] [address]  [phone]       August 22, 2023    Kathy Hays  [address]   [place] [address]    Dear Kathy,    We care about your health and have reviewed your health plan and are making recommendations based on this review, to optimize your health.    You are in particular need of attention regarding:  -Wellness (Physical) Visit   -Chlamydia Screening    We are recommending that you:  -schedule a WELLNESS (Physical) APPOINTMENT with me.        -Be tested for Chlamydia      Annual testing is recommended for sexually active women between the ages of 15 and 25.     Chlamydia is the most common bacterial sexually transmitted disease in the [place], according to the Centers for Disease Control (CDC), yet many women considered at risk for the disease do not get the recommended annual screening test.     Chlamydia has no symptoms and left untreated, it can cause infertility and other serious health problems. Chlamydia is easily cured with antibiotics.  We have enclosed a brochure that gives you additional information about Chlamydia.    Testing is now usually done by leaving a urine sample with a lab-only appointment or you can make an office visit if you have other concerns. (If you are not recently or currently sexually active, then please contact us so we can update your medical record.)      In addition, here is a list of due or overdue Health Maintenance reminders.    Health Maintenance Due   Topic Date Due    Discuss Advance Care Planning  Never done    Yearly Preventive Visit  02/11/2021    COVID-19 Vaccine (4 - Pfizer series) 04/15/2022    Chlamydia Screening  05/09/2023    PAP Smear  Never done       To address the above recommendations, we encourage you to contact us at [phone]. They will assist you with finding the most convenient time and location.    Thank you for trusting [facility] the [place] and we appreciate the opportunity to serve you.  We look forward to supporting your healthcare needs in the future.    Healthy Regards,    Your [facility]

## 2023-08-22 NOTE — TELEPHONE ENCOUNTER
Patient Quality Outreach    Patient is due for the following:   Physical Preventive Adult Physical  Chlamydia Screening    Next Steps:   Schedule a Adult Preventative    Type of outreach:    Sent letter.      Questions for provider review:    None           Edna Olmstead

## 2023-10-18 ASSESSMENT — ENCOUNTER SYMPTOMS
NAUSEA: 0
MYALGIAS: 0
DIARRHEA: 1
COUGH: 0
FREQUENCY: 0
HEADACHES: 0
DYSURIA: 0
SHORTNESS OF BREATH: 0
HEARTBURN: 0
ARTHRALGIAS: 0
WEAKNESS: 0
FEVER: 0
ABDOMINAL PAIN: 0
SORE THROAT: 0
JOINT SWELLING: 0
HEMATOCHEZIA: 0
BREAST MASS: 0
CHILLS: 0
PALPITATIONS: 0
NERVOUS/ANXIOUS: 0
CONSTIPATION: 0
DIZZINESS: 0
EYE PAIN: 0
HEMATURIA: 0
PARESTHESIAS: 0

## 2023-10-20 NOTE — PROGRESS NOTES
SUBJECTIVE:   CC: Kathy is an 23 year old who presents for preventive health visit.       Healthy Habits:     Getting at least 3 servings of Calcium per day:  NO    Bi-annual eye exam:  Yes    Dental care twice a year:  Yes    Sleep apnea or symptoms of sleep apnea:  Sleep apnea    Diet:  Regular (no restrictions) and Breakfast skipped    Frequency of exercise:  None    Taking medications regularly:  Yes    Medication side effects:  Not applicable and None    Additional concerns today:  Yes    Here for annual physical.    Birthmark:  Has a quarter size birthmark to left anterolateral neck that has been present since birth.  Reports it has been quite bothersome throughout the years both cosmetically as well as emotionally.  Reports she has been bullied quite a bit regarding this.  There is hair that grows from the area that she often has to shave.  Reports she discussed having it removed previously but was told it would be too risky given its overlying of the carotid region.  She would like to meet with dermatology for discussion about possible management.    Mouth:  Struggles consistently with teeth grinding especially at night.  Discussed this with her dentist who recommended possible mouthguard.  During the screening questionnaires she had screened for possible sleep apnea.  She does not snore regularly, no witnessed apneic episodes.  She is needing clearance from a provider prior to obtaining mouthguard from dentist.    Contraception: None  Risk for STI?: Would like screening  Last pap: None previous  Any hx of abnormal paps: NA  Cholesterol/DM concerns/screening: NA  Tobacco?: Former  Calcium intake: Dietary  DEXA: Not indicated based on patient age and health status.   Last mammo: Not indicated based on patient age and health status.   Colonoscopy: Not indicated based on patient age and health status.   Immunizations: Declined flu and COVID        Social History     Tobacco Use     Smoking status: Former      Types: Vaping Device     Quit date: 2023     Years since quittin.8     Smokeless tobacco: Never   Substance Use Topics     Alcohol use: Yes     Alcohol/week: 2.0 standard drinks of alcohol     Types: 2 Cans of beer per week     Comment: 1-2 drinks a week             10/18/2023     3:23 PM   Alcohol Use   Prescreen: >3 drinks/day or >7 drinks/week? No     Reviewed orders with patient.  Reviewed health maintenance and updated orders accordingly - Yes      Breast Cancer Screening:        History of abnormal Pap smear: Last 3 Pap and HPV Results:       Reviewed and updated as needed this visit by clinical staff   Tobacco  Allergies  Meds   Med Hx  Surg Hx  Fam Hx  Soc Hx        Reviewed and updated as needed this visit by Provider                 Past Medical History:   Diagnosis Date     Acne vulgaris      Major depression      Malformation of urachus           Past Surgical History:   Procedure Laterality Date     DILATION AND CURETTAGE SUCTION N/A 6/15/2023    Procedure: DILATION AND CURETTAGE, UTERUS, USING SUCTION;  Surgeon: Sandra Murphy MD;  Location: [facility] OR     INCISION AND DRAINAGE  2007    INCISION AND DRAINAGE,Excision of a urachus     TONSILLECTOMY, ADENOIDECTOMY, COMBINED       OB History    Para Term  AB Living   1 0 0 0 0 0   SAB IAB Ectopic Multiple Live Births   0 0 0 0 0      # Outcome Date GA Lbr Darrell/2nd Weight Sex Delivery Anes PTL Lv   1                 Review of Systems   Constitutional:  Negative for chills and fever.   HENT:  Negative for congestion, ear pain, hearing loss and sore throat.    Eyes:  Negative for pain and visual disturbance.   Respiratory:  Negative for cough and shortness of breath.    Cardiovascular:  Negative for chest pain, palpitations and peripheral edema.   Gastrointestinal:  Positive for diarrhea. Negative for abdominal pain, constipation, heartburn, hematochezia and nausea.   Breasts:  Negative for tenderness,  "breast mass and discharge.   Genitourinary:  Negative for dysuria, frequency, genital sores, hematuria, pelvic pain, urgency, vaginal bleeding and vaginal discharge.   Musculoskeletal:  Negative for arthralgias, joint swelling and myalgias.   Skin:  Negative for rash.   Neurological:  Negative for dizziness, weakness, headaches and paresthesias.   Psychiatric/Behavioral:  Positive for mood changes. The patient is not nervous/anxious.           OBJECTIVE:   /88   Pulse 88   Temp 98.8  F (37.1  C) (Tympanic)   Resp 16   Ht 1.651 m (5' 5\")   Wt 74.4 kg (164 lb)   LMP 10/13/2023 (Exact Date)   SpO2 97%   Breastfeeding No   BMI 27.29 kg/m    Physical Exam  GENERAL: healthy, alert and no distress  EYES: Eyes grossly normal to inspection, PERRL and conjunctivae and sclerae normal  HENT: ear canals and TM's normal, nose and mouth without ulcers or lesions  NECK: no adenopathy, no asymmetry, masses, or scars and thyroid normal to palpation  RESP: lungs clear to auscultation - no rales, rhonchi or wheezes  CV: regular rate and rhythm, normal S1 S2, no S3 or S4, no murmur, click or rub, no peripheral edema and peripheral pulses strong  MS: no gross musculoskeletal defects noted, no edema  SKIN: no suspicious lesions or rashes  NEURO: Normal strength and tone, mentation intact and speech normal  PSYCH: mentation appears normal, affect normal/bright        ASSESSMENT/PLAN:       ICD-10-CM    1. Routine history and physical examination of adult  Z00.00       2. Skin lesion  L98.9 Adult Dermatology  Referral      3. Screen for STD (sexually transmitted disease)  Z11.3 GC/Chlamydia by PCR        Due for Pap smear screening, patient deferred today.  Declined flu and COVID immunizations, otherwise up-to-date.  Encouraged healthy lifestyle.    2.  Birthmark approximately quadrant size overlying left carotid region that is quite bothersome cosmetically and emotionally for patient.  Requesting referral to " "dermatology for consideration of treatment options.    3.  Results pending encourage safe sex practices.      Patient has been advised of split billing requirements and indicates understanding: Yes      COUNSELING:  Reviewed preventive health counseling, as reflected in patient instructions      BMI:   Estimated body mass index is 27.29 kg/m  as calculated from the following:    Height as of this encounter: 1.651 m (5' 5\").    Weight as of this encounter: 74.4 kg (164 lb).   Weight management plan: Discussed healthy diet and exercise guidelines      She reports that she quit smoking about 9 months ago. Her smoking use included vaping device. She has never used smokeless tobacco.          Elma Rivero PA-C  [facility] \[facility]\""  "

## 2023-10-24 ENCOUNTER — OFFICE VISIT (OUTPATIENT)
Dept: FAMILY MEDICINE | Facility: OTHER | Age: 23
End: 2023-10-24
Attending: PHYSICIAN ASSISTANT
Payer: COMMERCIAL

## 2023-10-24 VITALS
TEMPERATURE: 98.8 F | RESPIRATION RATE: 16 BRPM | DIASTOLIC BLOOD PRESSURE: 88 MMHG | BODY MASS INDEX: 27.32 KG/M2 | SYSTOLIC BLOOD PRESSURE: 116 MMHG | OXYGEN SATURATION: 97 % | HEART RATE: 88 BPM | HEIGHT: 65 IN | WEIGHT: 164 LBS

## 2023-10-24 DIAGNOSIS — Z00.00 ROUTINE HISTORY AND PHYSICAL EXAMINATION OF ADULT: Primary | ICD-10-CM

## 2023-10-24 DIAGNOSIS — Z11.3 SCREEN FOR STD (SEXUALLY TRANSMITTED DISEASE): ICD-10-CM

## 2023-10-24 DIAGNOSIS — L98.9 SKIN LESION: ICD-10-CM

## 2023-10-24 LAB
C TRACH DNA SPEC QL PROBE+SIG AMP: NEGATIVE
N GONORRHOEA DNA SPEC QL NAA+PROBE: NEGATIVE

## 2023-10-24 PROCEDURE — 99395 PREV VISIT EST AGE 18-39: CPT | Performed by: PHYSICIAN ASSISTANT

## 2023-10-24 PROCEDURE — 87591 N.GONORRHOEAE DNA AMP PROB: CPT | Mod: ZL | Performed by: PHYSICIAN ASSISTANT

## 2023-10-24 PROCEDURE — 87491 CHLMYD TRACH DNA AMP PROBE: CPT | Mod: ZL | Performed by: PHYSICIAN ASSISTANT

## 2023-10-24 PROCEDURE — 99213 OFFICE O/P EST LOW 20 MIN: CPT | Mod: 25 | Performed by: PHYSICIAN ASSISTANT

## 2023-10-24 ASSESSMENT — ENCOUNTER SYMPTOMS
NAUSEA: 0
DIZZINESS: 0
SORE THROAT: 0
NERVOUS/ANXIOUS: 0
SHORTNESS OF BREATH: 0
PALPITATIONS: 0
ARTHRALGIAS: 0
EYE PAIN: 0
HEMATURIA: 0
ABDOMINAL PAIN: 0
HEADACHES: 0
HEARTBURN: 0
DYSURIA: 0
PARESTHESIAS: 0
CHILLS: 0
BREAST MASS: 0
FREQUENCY: 0
COUGH: 0
CONSTIPATION: 0
FEVER: 0
DIARRHEA: 1
HEMATOCHEZIA: 0
MYALGIAS: 0
WEAKNESS: 0
JOINT SWELLING: 0

## 2023-10-24 ASSESSMENT — PAIN SCALES - GENERAL: PAINLEVEL: NO PAIN (0)

## 2023-11-08 ENCOUNTER — MYC MEDICAL ADVICE (OUTPATIENT)
Dept: FAMILY MEDICINE | Facility: OTHER | Age: 23
End: 2023-11-08
Payer: COMMERCIAL

## 2023-11-08 DIAGNOSIS — L98.9 SKIN LESION: Primary | ICD-10-CM

## 2023-11-08 NOTE — TELEPHONE ENCOUNTER
Pt is inquiring about a referral to Dr. Camarena (a surgeon in [place]). The patient received a referral from the dermatologist but stated that the surgeon is also requesting a referral from her PCP.     Jesus'd up referral order for PCP and routing to PCP.    Updated patient on MyChart.     Milind Beyer RN on 11/8/2023 at 12:53 PM    Referral signed. Pt was updated on MyChart by PCP.    Milind Beyer RN on 11/9/2023 at 8:14 AM

## 2024-02-09 ENCOUNTER — MYC MEDICAL ADVICE (OUTPATIENT)
Dept: OBGYN | Facility: OTHER | Age: 24
End: 2024-02-09
Payer: COMMERCIAL

## 2024-09-30 ENCOUNTER — MYC MEDICAL ADVICE (OUTPATIENT)
Dept: OBGYN | Facility: OTHER | Age: 24
End: 2024-09-30

## 2024-09-30 ENCOUNTER — OFFICE VISIT (OUTPATIENT)
Dept: OBGYN | Facility: OTHER | Age: 24
End: 2024-09-30
Attending: STUDENT IN AN ORGANIZED HEALTH CARE EDUCATION/TRAINING PROGRAM
Payer: COMMERCIAL

## 2024-09-30 VITALS
BODY MASS INDEX: 29.16 KG/M2 | SYSTOLIC BLOOD PRESSURE: 124 MMHG | DIASTOLIC BLOOD PRESSURE: 78 MMHG | HEART RATE: 68 BPM | WEIGHT: 175 LBS | HEIGHT: 65 IN

## 2024-09-30 DIAGNOSIS — N91.4 SECONDARY OLIGOMENORRHEA: ICD-10-CM

## 2024-09-30 DIAGNOSIS — Z30.09 FAMILY PLANNING COUNSELING: Primary | ICD-10-CM

## 2024-09-30 LAB
ESTRADIOL SERPL-MCNC: 39 PG/ML
FSH SERPL IRP2-ACNC: 5.9 MIU/ML
HCG INTACT+B SERPL-ACNC: <1 MIU/ML
LH SERPL-ACNC: 22.5 MIU/ML
PROLACTIN SERPL 3RD IS-MCNC: 22 NG/ML (ref 5–23)
TSH SERPL DL<=0.005 MIU/L-ACNC: 1.77 UIU/ML (ref 0.3–4.2)

## 2024-09-30 PROCEDURE — 84443 ASSAY THYROID STIM HORMONE: CPT | Mod: ZL

## 2024-09-30 PROCEDURE — 99215 OFFICE O/P EST HI 40 MIN: CPT

## 2024-09-30 PROCEDURE — 84146 ASSAY OF PROLACTIN: CPT | Mod: ZL

## 2024-09-30 PROCEDURE — 84702 CHORIONIC GONADOTROPIN TEST: CPT | Mod: ZL

## 2024-09-30 PROCEDURE — 83002 ASSAY OF GONADOTROPIN (LH): CPT | Mod: ZL

## 2024-09-30 PROCEDURE — 36415 COLL VENOUS BLD VENIPUNCTURE: CPT | Mod: ZL

## 2024-09-30 PROCEDURE — 83001 ASSAY OF GONADOTROPIN (FSH): CPT | Mod: ZL

## 2024-09-30 PROCEDURE — 82670 ASSAY OF TOTAL ESTRADIOL: CPT | Mod: ZL

## 2024-09-30 RX ORDER — PROGESTERONE 100 MG/1
100 CAPSULE ORAL DAILY
Qty: 10 CAPSULE | Refills: 0 | Status: SHIPPED | OUTPATIENT
Start: 2024-09-30

## 2024-09-30 ASSESSMENT — PAIN SCALES - GENERAL: PAINLEVEL: NO PAIN (0)

## 2024-09-30 NOTE — PROGRESS NOTES
"GYN visit    S: Ms. Kathy Hays is a 24 year old  here for discussion of family goals. Kathy and her significant other have been trying to conceive since 2023. She had a spontaneous pregnancy last summer which unfortunately resulted in a missed AB. A suction D&C was performed at that time.    Since then her periods have irregular. They come every 27-40 days and she uses apps on her phone to help track her fertile windows and ovulation estimates. She has used ovulation predictor kits as well.  She notes that she has not had an LMP since July of this year.  She reports that when she does get a period they last approximately 5 days and are very heavy with clots.  She reports crampy pain a few days before and worsening throughout her cycle.  She also reports extreme bloating.  She has taken multiple pregnancy test at home and these have all been negative.  She has never had a fertility workup.    They are interested in family-planning with assistance from our clinic.      O:  /78 (BP Location: Right arm, Patient Position: Sitting, Cuff Size: Adult Regular)   Pulse 68   Ht 1.651 m (5' 5\")   Wt 79.4 kg (175 lb)   LMP 2024   BMI 29.12 kg/m    Gen: Well-appearing, NAD  Pulm: nonlabored    Pelvic: Deferred today    A/P:  Ms. Kathy Hays is a 24 year old  here for family-planning.  She has been try to conceive since 2023.  She has been unsuccessful so far.  She is utilizing ovulation predictor kits as well as an cirilo to help track her periods.  Her menstrual cycles have become quite irregular anywhere between 27 and 40 days.    #  Infertility: unknown etiology at this time  - Labs ordered today:              Serum HCG & TSH drawn today  -Plan baseline labs              FSH              LH              E2              PRL   DHEA   Testosterone   17 OH progesterone    -Will induce patient's menstrual cycle with Provera 10 mg x 10 days.  Discussed with her on day 3-4 we " complete baseline lab work as above.     - Male factor considerations: This partner has not had a semen analysis              Recommended semen analysis consideration for him.  Will await until patient's lab work is completed.     - Anatomical/tract considerations: Negative STI history has never had an HSG           Pelvic US scheduled with planning to meet with Dr. BRIONES following this for possible consideration of HSG     - Counseled on how to use ovulation predictor kits and it appears patient is utilizing these correctly.  Discussed continued use when she gets her next period.    -Recommend Pap as well as gonorrhea chlamydia at next visit.     Total amount of time spent during today's encounter including chart prep, face to face time, counseling and documentation was 45 minutes       DANI Molina CNP on 9/30/2024 at 2:29 PM

## 2024-09-30 NOTE — NURSING NOTE
Chief Complaint   Patient presents with    Consult     Fertility        Medication Reconciliation: complete    Trying since last miscarriage over a year ago, and still not having luck with pregnancy.  She did just miss a cycle but at home tests are negative.       Katherin Bautista LPN........................9/30/2024  1:52 PM

## 2024-10-15 ENCOUNTER — MYC MEDICAL ADVICE (OUTPATIENT)
Dept: OBGYN | Facility: OTHER | Age: 24
End: 2024-10-15
Payer: COMMERCIAL

## 2024-10-15 DIAGNOSIS — Z30.09 FAMILY PLANNING COUNSELING: Primary | ICD-10-CM

## 2024-10-15 RX ORDER — MEDROXYPROGESTERONE ACETATE 10 MG
10 TABLET ORAL DAILY
Qty: 10 TABLET | Refills: 0 | Status: SHIPPED | OUTPATIENT
Start: 2024-10-15

## 2024-10-30 ENCOUNTER — LAB (OUTPATIENT)
Dept: LAB | Facility: OTHER | Age: 24
End: 2024-10-30
Payer: COMMERCIAL

## 2024-10-30 DIAGNOSIS — Z30.09 FAMILY PLANNING COUNSELING: ICD-10-CM

## 2024-10-30 DIAGNOSIS — N91.4 SECONDARY OLIGOMENORRHEA: ICD-10-CM

## 2024-10-30 LAB
ESTRADIOL SERPL-MCNC: 16 PG/ML
FSH SERPL IRP2-ACNC: 5.1 MIU/ML
LH SERPL-ACNC: 14.1 MIU/ML
PROLACTIN SERPL 3RD IS-MCNC: 26 NG/ML (ref 5–23)

## 2024-10-30 PROCEDURE — 83002 ASSAY OF GONADOTROPIN (LH): CPT | Mod: ZL

## 2024-10-30 PROCEDURE — 83498 ASY HYDROXYPROGESTERONE 17-D: CPT | Mod: ZL

## 2024-10-30 PROCEDURE — 83001 ASSAY OF GONADOTROPIN (FSH): CPT | Mod: ZL

## 2024-10-30 PROCEDURE — 84146 ASSAY OF PROLACTIN: CPT | Mod: ZL

## 2024-10-30 PROCEDURE — 84403 ASSAY OF TOTAL TESTOSTERONE: CPT | Mod: ZL

## 2024-10-30 PROCEDURE — 82670 ASSAY OF TOTAL ESTRADIOL: CPT | Mod: ZL

## 2024-10-30 PROCEDURE — 82627 DEHYDROEPIANDROSTERONE: CPT | Mod: ZL

## 2024-10-30 PROCEDURE — 36415 COLL VENOUS BLD VENIPUNCTURE: CPT | Mod: ZL

## 2024-11-01 LAB — DHEA-S SERPL-MCNC: 976 UG/DL (ref 35–430)

## 2024-11-02 LAB — TESTOST SERPL-MCNC: 28 NG/DL (ref 8–60)

## 2024-11-05 ENCOUNTER — HOSPITAL ENCOUNTER (OUTPATIENT)
Dept: ULTRASOUND IMAGING | Facility: OTHER | Age: 24
Discharge: HOME | End: 2024-11-05
Payer: COMMERCIAL

## 2024-11-05 DIAGNOSIS — N91.4 SECONDARY OLIGOMENORRHEA: ICD-10-CM

## 2024-11-05 DIAGNOSIS — Z30.09 FAMILY PLANNING COUNSELING: ICD-10-CM

## 2024-11-05 PROCEDURE — 76856 US EXAM PELVIC COMPLETE: CPT

## 2024-11-07 LAB — 17OHP SERPL-MCNC: 48 NG/DL

## 2024-11-08 ENCOUNTER — LAB (OUTPATIENT)
Dept: LAB | Facility: OTHER | Age: 24
End: 2024-11-08
Payer: COMMERCIAL

## 2024-11-08 DIAGNOSIS — Z30.09 FAMILY PLANNING COUNSELING: ICD-10-CM

## 2024-11-08 LAB — PROLACTIN SERPL 3RD IS-MCNC: 24 NG/ML (ref 5–23)

## 2024-11-08 PROCEDURE — 84146 ASSAY OF PROLACTIN: CPT | Mod: ZL

## 2024-11-08 PROCEDURE — 36415 COLL VENOUS BLD VENIPUNCTURE: CPT | Mod: ZL

## 2024-11-12 ENCOUNTER — MEDICAL CORRESPONDENCE (OUTPATIENT)
Dept: HEALTH INFORMATION MANAGEMENT | Facility: CLINIC | Age: 24
End: 2024-11-12

## 2024-11-12 ENCOUNTER — OFFICE VISIT (OUTPATIENT)
Dept: OBGYN | Facility: OTHER | Age: 24
End: 2024-11-12
Attending: STUDENT IN AN ORGANIZED HEALTH CARE EDUCATION/TRAINING PROGRAM
Payer: COMMERCIAL

## 2024-11-12 VITALS
HEART RATE: 76 BPM | DIASTOLIC BLOOD PRESSURE: 80 MMHG | BODY MASS INDEX: 29.12 KG/M2 | SYSTOLIC BLOOD PRESSURE: 124 MMHG | WEIGHT: 175 LBS

## 2024-11-12 DIAGNOSIS — Z12.4 CERVICAL CANCER SCREENING: Primary | ICD-10-CM

## 2024-11-12 DIAGNOSIS — R79.89 ELEVATED DEHYDROEPIANDROSTERONE (DHEA) LEVEL: ICD-10-CM

## 2024-11-12 DIAGNOSIS — E22.1 HYPERPROLACTINEMIA (H): ICD-10-CM

## 2024-11-12 DIAGNOSIS — Z11.3 SCREEN FOR STD (SEXUALLY TRANSMITTED DISEASE): ICD-10-CM

## 2024-11-12 ASSESSMENT — PATIENT HEALTH QUESTIONNAIRE - PHQ9
10. IF YOU CHECKED OFF ANY PROBLEMS, HOW DIFFICULT HAVE THESE PROBLEMS MADE IT FOR YOU TO DO YOUR WORK, TAKE CARE OF THINGS AT HOME, OR GET ALONG WITH OTHER PEOPLE: SOMEWHAT DIFFICULT
SUM OF ALL RESPONSES TO PHQ QUESTIONS 1-9: 8
SUM OF ALL RESPONSES TO PHQ QUESTIONS 1-9: 8

## 2024-11-12 ASSESSMENT — PAIN SCALES - GENERAL: PAINLEVEL_OUTOF10: NO PAIN (0)

## 2024-11-12 NOTE — PROGRESS NOTES
Follow-Up Visit    S: Ms. Kathy Hays is a 24 year old  here for discussion of abnormal hormonal lab values and follow up as we have started an infertility and irregular menses workup. She shares with me her menstrual log over the past 6 months and it shows that her cycles range anywhere from  days. She mainly has cycles in the 30-39 day range, but most recently had a 107 day cycle. When she bleeds, it lasts 4-16 days. The flow is heavy and crampy.    She has been trying to get pregnant since shortly after her suction D&C following a first trimester miscarriage.     She and her partner are interested in continuing the infertility workup- but understand the endocrinology piece is the first priority.    Her partner has never had a semen analysis, but they were able to conceive with the first pregnancy that unfortunately was a miscarriage.     She has never had an HSG, but has been spontaneously pregnant before. She has never had any pelvic infections    O:  /80 (BP Location: Right arm, Patient Position: Sitting, Cuff Size: Adult Large)   Pulse 76   Wt 79.4 kg (175 lb)   LMP 10/28/2024 (Exact Date)   BMI 29.12 kg/m    Gen: Well-appearing, NAD  Pulm: nonlabored  Ext: No LE edema, extremities warm and well perfused    Pelvic:  Deferred at this time, plan to do this when HSG is performed    A/P:  Ms. Kathy Hays is a 24 year old  here for follow up of labs: discovered elevated DHEAS    # Elevated DHEAS  -- 976, at this level I am concerned about possible adrenal tumor and hypersecretion  -- MR adrenal gland ordered  -- Endocrinology referral placed    # Elevated prolactin  -- Mildly elevated despite repeat lab draw early in the morning  -- No neurological symptoms  -- MR pituitary ordered  -- Endocrinology referral placed    # Goals for conception  -- I believe the main breakdown in conception is the component of oligo and anovulation, likely related to the hormone imbalances noted  above.   -- Plan for HSG after her next period  -- Holding off on SA at this time    # Cervical cancer screening  -- First pap smear to be collected when we do the HSG    Answers submitted by the patient for this visit:  Patient Health Questionnaire (Submitted on 11/12/2024)  If you checked off any problems, how difficult have these problems made it for you to do your work, take care of things at home, or get along with other people?: Somewhat difficult  PHQ9 TOTAL SCORE: 8  General Questionnaire (Submitted on 11/12/2024)  Chief Complaint: Chronic problems general questions HPI Form  What is the reason for your visit today? : pcos diagnosis  How many servings of fruits and vegetables do you eat daily?: 0-1  On average, how many sweetened beverages do you drink each day (Examples: soda, juice, sweet tea, etc.  Do NOT count diet or artificially sweetened beverages)?: 2  How many minutes a day do you exercise enough to make your heart beat faster?: 30 to 60  How many days a week do you exercise enough to make your heart beat faster?: 4  How many days per week do you miss taking your medication?: 0  Questionnaire about: Chronic problems general questions HPI Form (Submitted on 11/12/2024)  Chief Complaint: Chronic problems general questions HPI Form    Total amount of time spent during today's encounter including chart prep, face to face, exam and documentation was 47 minutes  Sandra Murphy MD on 11/12/2024 at 9:11 PM

## 2024-11-12 NOTE — NURSING NOTE
Chief Complaint   Patient presents with    RECHECK     Discuss PCOS and Fertility        Medication Reconciliation: complete      Katherin Bautista LPN........................11/12/2024  3:50 PM

## 2024-11-13 DIAGNOSIS — F40.240 CLAUSTROPHOBIA: Primary | ICD-10-CM

## 2024-11-13 RX ORDER — LORAZEPAM 0.5 MG/1
0.5 TABLET ORAL ONCE
Qty: 1 TABLET | Refills: 0 | Status: SHIPPED | OUTPATIENT
Start: 2024-11-13 | End: 2024-11-13

## 2024-11-20 ENCOUNTER — HOSPITAL ENCOUNTER (OUTPATIENT)
Dept: GENERAL RADIOLOGY | Facility: OTHER | Age: 24
Discharge: HOME | End: 2024-11-20
Attending: RADIOLOGY
Payer: COMMERCIAL

## 2024-11-20 ENCOUNTER — HOSPITAL ENCOUNTER (OUTPATIENT)
Dept: MRI IMAGING | Facility: OTHER | Age: 24
Discharge: HOME | End: 2024-11-20
Attending: STUDENT IN AN ORGANIZED HEALTH CARE EDUCATION/TRAINING PROGRAM
Payer: COMMERCIAL

## 2024-11-20 DIAGNOSIS — R79.89 ELEVATED DEHYDROEPIANDROSTERONE (DHEA) LEVEL: ICD-10-CM

## 2024-11-20 DIAGNOSIS — Z01.89 ENCOUNTER FOR IMAGING TO SCREEN FOR EYE METAL PRIOR TO MAGNETIC RESONANCE IMAGING (MRI): ICD-10-CM

## 2024-11-20 PROCEDURE — 70250 X-RAY EXAM OF SKULL: CPT

## 2024-11-20 PROCEDURE — 74183 MRI ABD W/O CNTR FLWD CNTR: CPT

## 2024-11-20 PROCEDURE — A9575 INJ GADOTERATE MEGLUMI 0.1ML: HCPCS | Performed by: STUDENT IN AN ORGANIZED HEALTH CARE EDUCATION/TRAINING PROGRAM

## 2024-11-20 PROCEDURE — 255N000002 HC RX 255 OP 636: Performed by: STUDENT IN AN ORGANIZED HEALTH CARE EDUCATION/TRAINING PROGRAM

## 2024-11-20 RX ORDER — GADOTERATE MEGLUMINE 376.9 MG/ML
20 INJECTION INTRAVENOUS ONCE
Status: COMPLETED | OUTPATIENT
Start: 2024-11-20 | End: 2024-11-20

## 2024-11-20 RX ADMIN — GADOTERATE MEGLUMINE 16 ML: 376.9 INJECTION INTRAVENOUS at 10:21

## 2024-11-27 ENCOUNTER — MYC MEDICAL ADVICE (OUTPATIENT)
Dept: OBGYN | Facility: OTHER | Age: 24
End: 2024-11-27
Payer: COMMERCIAL

## 2024-11-27 DIAGNOSIS — E22.1 HYPERPROLACTINEMIA (H): ICD-10-CM

## 2024-11-27 DIAGNOSIS — F41.9 ANXIETY: Primary | ICD-10-CM

## 2024-11-27 DIAGNOSIS — D35.2 PITUITARY ADENOMA (H): Primary | ICD-10-CM

## 2024-11-27 NOTE — PROGRESS NOTES
Soonest available endocrinology at [facility] was not until June 2025-- in light of pituitary adenoma discovered on MRI and desire to begin her family, this 7 month wait was too long.    I called other practices in the area and [facility] was able to provide a new patient visit on Wednesday 12/4/2024. All referral documentation and results were faxed to the following numbers for preparation of the consultation.    [phone] and [phone]    Sandra Murphy MD on 11/27/2024 at 3:31 PM

## 2024-12-02 ENCOUNTER — DOCUMENTATION ONLY (OUTPATIENT)
Dept: OBGYN | Facility: OTHER | Age: 24
End: 2024-12-02
Payer: COMMERCIAL

## 2024-12-02 DIAGNOSIS — Z01.812 PRE-PROCEDURE LAB EXAM: Primary | ICD-10-CM

## 2024-12-02 NOTE — PROGRESS NOTES
Kathy Hays has an upcoming lab appointment and there are no orders available. Please review and place future orders, as appropriate.    Leilani Huerta

## 2024-12-20 ENCOUNTER — LAB (OUTPATIENT)
Dept: LAB | Facility: OTHER | Age: 24
End: 2024-12-20
Attending: STUDENT IN AN ORGANIZED HEALTH CARE EDUCATION/TRAINING PROGRAM
Payer: COMMERCIAL

## 2024-12-20 DIAGNOSIS — R79.89 OTHER SPECIFIED ABNORMAL FINDINGS OF BLOOD CHEMISTRY: ICD-10-CM

## 2024-12-20 DIAGNOSIS — D35.2 BENIGN NEOPLASM OF PITUITARY GLAND (H): ICD-10-CM

## 2024-12-20 LAB
CORTIS SERPL-MCNC: 18.8 UG/DL
ESTRADIOL SERPL-MCNC: 68 PG/ML
FSH SERPL IRP2-ACNC: 4.4 MIU/ML
LH SERPL-ACNC: 14.2 MIU/ML
PROLACTIN SERPL 3RD IS-MCNC: 36 NG/ML (ref 5–23)
T4 FREE SERPL-MCNC: 1.32 NG/DL (ref 0.9–1.7)
TSH SERPL DL<=0.005 MIU/L-ACNC: 2.12 UIU/ML (ref 0.3–4.2)

## 2024-12-20 PROCEDURE — 84439 ASSAY OF FREE THYROXINE: CPT | Mod: ZL

## 2024-12-20 PROCEDURE — 82024 ASSAY OF ACTH: CPT | Mod: ZL

## 2024-12-20 PROCEDURE — 82627 DEHYDROEPIANDROSTERONE: CPT | Mod: ZL

## 2024-12-20 PROCEDURE — 82533 TOTAL CORTISOL: CPT | Mod: ZL

## 2024-12-20 PROCEDURE — 84305 ASSAY OF SOMATOMEDIN: CPT | Mod: ZL

## 2024-12-20 PROCEDURE — 83001 ASSAY OF GONADOTROPIN (FSH): CPT | Mod: ZL

## 2024-12-20 PROCEDURE — 82670 ASSAY OF TOTAL ESTRADIOL: CPT | Mod: ZL

## 2024-12-20 PROCEDURE — 36415 COLL VENOUS BLD VENIPUNCTURE: CPT | Mod: ZL

## 2024-12-20 PROCEDURE — 84403 ASSAY OF TOTAL TESTOSTERONE: CPT | Mod: ZL

## 2024-12-20 PROCEDURE — 83002 ASSAY OF GONADOTROPIN (LH): CPT | Mod: ZL

## 2024-12-20 PROCEDURE — 84146 ASSAY OF PROLACTIN: CPT | Mod: ZL

## 2024-12-20 PROCEDURE — 84443 ASSAY THYROID STIM HORMONE: CPT | Mod: ZL

## 2024-12-23 LAB
ACTH PLAS-MCNC: 15 PG/ML
DHEA-S SERPL-MCNC: >1000 UG/DL (ref 35–430)

## 2024-12-24 LAB
IGF-I BLD-MCNC: 184 NG/ML (ref 105–337)
TESTOST SERPL-MCNC: 43 NG/DL (ref 8–60)

## 2025-01-26 ASSESSMENT — PATIENT HEALTH QUESTIONNAIRE - PHQ9
SUM OF ALL RESPONSES TO PHQ QUESTIONS 1-9: 12
SUM OF ALL RESPONSES TO PHQ QUESTIONS 1-9: 12
10. IF YOU CHECKED OFF ANY PROBLEMS, HOW DIFFICULT HAVE THESE PROBLEMS MADE IT FOR YOU TO DO YOUR WORK, TAKE CARE OF THINGS AT HOME, OR GET ALONG WITH OTHER PEOPLE: VERY DIFFICULT

## 2025-01-27 ENCOUNTER — OFFICE VISIT (OUTPATIENT)
Dept: OBGYN | Facility: OTHER | Age: 25
End: 2025-01-27
Attending: STUDENT IN AN ORGANIZED HEALTH CARE EDUCATION/TRAINING PROGRAM
Payer: COMMERCIAL

## 2025-01-27 VITALS
WEIGHT: 180 LBS | DIASTOLIC BLOOD PRESSURE: 70 MMHG | HEART RATE: 105 BPM | BODY MASS INDEX: 29.95 KG/M2 | SYSTOLIC BLOOD PRESSURE: 134 MMHG

## 2025-01-27 DIAGNOSIS — E22.1 HYPERPROLACTINEMIA: ICD-10-CM

## 2025-01-27 DIAGNOSIS — Z30.09 FAMILY PLANNING COUNSELING: ICD-10-CM

## 2025-01-27 DIAGNOSIS — R79.89 ELEVATED DEHYDROEPIANDROSTERONE (DHEA) LEVEL: ICD-10-CM

## 2025-01-27 DIAGNOSIS — E28.2 PCOS (POLYCYSTIC OVARIAN SYNDROME): Primary | ICD-10-CM

## 2025-01-27 DIAGNOSIS — D35.2 PITUITARY MICROADENOMA (H): ICD-10-CM

## 2025-01-27 PROCEDURE — 99214 OFFICE O/P EST MOD 30 MIN: CPT | Performed by: STUDENT IN AN ORGANIZED HEALTH CARE EDUCATION/TRAINING PROGRAM

## 2025-01-27 RX ORDER — NORGESTIMATE AND ETHINYL ESTRADIOL 0.25-0.035
1 KIT ORAL DAILY
Qty: 84 TABLET | Refills: 4 | Status: SHIPPED | OUTPATIENT
Start: 2025-01-27

## 2025-01-27 NOTE — NURSING NOTE
"Chief Complaint   Patient presents with    Fertility     Patient is here for a follow up for fertility and update on health status.   Initial /70   Pulse 105   Wt 81.6 kg (180 lb)   LMP 01/07/2025 (Exact Date)   BMI 29.95 kg/m   Estimated body mass index is 29.95 kg/m  as calculated from the following:    Height as of 9/30/24: 1.651 m (5' 5\").    Weight as of this encounter: 81.6 kg (180 lb).  Medication Reconciliation: complete    Sandra Beal LPN    "

## 2025-01-27 NOTE — PROGRESS NOTES
Follow-Up Visit    S: Ms. Kathy Hays is a 24 year old  here for follow up and discussion of PCOS as well as update from her appt with endocrinology for signfiicantly elevated DHEAS and hyperprolactinemia.    Kathy notes that her most concerning health change has been the weight gain she has been experiencing.  She has always been in the 140s and now is in 180s. She doesn't feel like she recognizes herself in the mirror at times. She notes that every time they take her weight it has been increased. To address this she has started at-home workouts.     Another concern is that she also notes facial hair along her mustache line and a few hairs on her chin. She removes this with plucking. The skin around bikini area is dark and discolored and increased hair growth.    Her periods are starting to get more regular, the past two months they came on time and she bled for 6-7 days. The bleeding is heavy all throughout the week.       Updates regarding her endocrinology care:  Hyperprolactinemia-- we discovered a microadenoma along the pituitary with MRI (measuring 0.9 cm). She is getting a repeat MRI in 6 months to watch if this grows. No current medication treatment at this time. She also gets serial PRL levels drawn.  Significantly elevated DHEAS- now at levels >1000 (up from 975), MRI of adrenal glands show normal anatomy and no tumor present, but this is still significantly elevated without identified cause. She is getting a second opinion with [facility] in the coming weeks.    O:  /70   Pulse 105   Wt 81.6 kg (180 lb)   LMP 2025 (Exact Date)   BMI 29.95 kg/m    Gen: Well-appearing, NAD  Pulm: nonlabored      A/P:  Ms. Kathy Hays is a 24 year old  here for follow up of PCOS and updates on pituitary microadneoma with elevated PRL as well as signficantly elevated DHEA.  # weight gain with PCOS  - intersted in referral to nutrition team  -Will begin an exercise regimen with more  of a focus on weight training  - Interested in restarting birth control  to optimize her health before a pregnancy will begin- she has history of liking her prior OCPs and will send those again (orthocyclen) she will begin these after her next period.    # Hair growth  - I anticipate this is because of the significantly elevated DHEA, currently working with endocriology team to address the underlying issue with this.  -Encouraged plucking or waxing rather than shaving until we can identify the source of the elevated DHEA    # Pituitary microadenoma with elevated PRL  - Continue with endocrinology team, serial monitoring    Total amount of time spent during today's encounter including chart prep, face to face, exam and documentation was 35 minutes  Sandra Murphy MD on 1/29/2025 at 8:44 PM

## 2025-02-05 ENCOUNTER — MYC MEDICAL ADVICE (OUTPATIENT)
Dept: OBGYN | Facility: OTHER | Age: 25
End: 2025-02-05
Payer: COMMERCIAL

## 2025-02-05 DIAGNOSIS — D35.2 PITUITARY MICROADENOMA (H): Primary | ICD-10-CM

## 2025-02-05 DIAGNOSIS — R79.89 ELEVATED DEHYDROEPIANDROSTERONE SULFATE LEVEL: ICD-10-CM

## 2025-02-05 NOTE — TELEPHONE ENCOUNTER
Per LOV note 1/27/25 ALD:    # Pituitary microadenoma with elevated PRL  - Continue with endocrinology team, serial monitoring    Ambika Carcamo RN on 2/5/2025 at 3:46 PM

## 2025-02-06 ENCOUNTER — OFFICE VISIT (OUTPATIENT)
Dept: FAMILY MEDICINE | Facility: OTHER | Age: 25
End: 2025-02-06
Attending: STUDENT IN AN ORGANIZED HEALTH CARE EDUCATION/TRAINING PROGRAM
Payer: COMMERCIAL

## 2025-02-06 VITALS — BODY MASS INDEX: 29.79 KG/M2 | WEIGHT: 179 LBS

## 2025-02-06 DIAGNOSIS — E28.2 PCOS (POLYCYSTIC OVARIAN SYNDROME): ICD-10-CM

## 2025-02-06 NOTE — PROGRESS NOTES
Kathy is here today for MNT related to obesity, desired wt loss and PCOS.     PCP: Nanci Wright  Referring provider: Sandra Murphy    Kathy was recently diagnosed with PCOS. She has gained a fair amount of weight in the past few years related to this. She would like to lose some weight and focus on a more healthy lifestyle. Her goal wt is 135-140#. Intermittent goal: 160#.     Reviewed her typical intakes (she had a food log but forgot to bring it in). She often skips breakfast, starting her daily intakes around 3 pm of a moderate sized snack (breakfast burrito, 1/2 sandwich, etc). She eats dinner around 5-6 pm, often consisting of a vegetable, protein, and occasionally a 3rd side. She likes to have an evening snack of chocolate covered fruit, fruit roll ups or peanut butter cups. Beverages during the day often consist of a monster energy drink or red bull. She does drink coffee or tea some days. Usually 2 coke zeros daily as well.     She has started doing yoga at home a few days a week for the past couple weeks. Otherwise no formal planned exercise.     Body mass index is 29.79 kg/m .    Encounter Diagnosis   Name Primary?    PCOS (polycystic ovarian syndrome)      Diet: 4131-1124 kcal, 60 g protein, 25-35 g fiber daily.     Education today: discussed her estimated needs and portion sizes associated with the food groups. Reviewed a more mediterranean lifestyle for aiding in PCOS issues. Discussed the keto diet and its time and place. Encouraged increased fiber intake, reduced sugar and artifical sweetener intake. Encouraged adequate intakes, incorporating breakfast.     Materials provided: sample meals and recipes with estimated needs, guide to good nutrition, nutrition guide to wt control, mediterranean lifestyle recommendations and recipes, fiber content of foods list, snack ideas, eat right with my plate.     Goals/plan: Kathy will compare her food journal with her estimated needs and  portion sizes. She will develop realistic goals to more align with the recommendations. She will cut back on her energy drink intake along with increasing her fiber and water intake. She will incorporate planned exercise along with her yoga.     Kathy stated understanding and had no questions at this time.     Follow up encouraged ~3 months.     Time spent: 62 min    Gabi Morales RD on 2/6/2025 at 2:32 PM

## 2025-03-03 ENCOUNTER — APPOINTMENT (OUTPATIENT)
Dept: ULTRASOUND IMAGING | Facility: OTHER | Age: 25
End: 2025-03-03
Attending: FAMILY MEDICINE
Payer: COMMERCIAL

## 2025-03-03 ENCOUNTER — HOSPITAL ENCOUNTER (EMERGENCY)
Facility: OTHER | Age: 25
Discharge: HOME | End: 2025-03-03
Attending: FAMILY MEDICINE
Payer: COMMERCIAL

## 2025-03-03 ENCOUNTER — MYC MEDICAL ADVICE (OUTPATIENT)
Dept: OBGYN | Facility: OTHER | Age: 25
End: 2025-03-03

## 2025-03-03 VITALS
WEIGHT: 180 LBS | TEMPERATURE: 98.3 F | HEIGHT: 65 IN | DIASTOLIC BLOOD PRESSURE: 66 MMHG | HEART RATE: 73 BPM | OXYGEN SATURATION: 97 % | BODY MASS INDEX: 29.99 KG/M2 | SYSTOLIC BLOOD PRESSURE: 111 MMHG | RESPIRATION RATE: 16 BRPM

## 2025-03-03 DIAGNOSIS — R10.2 PELVIC PAIN IN FEMALE: ICD-10-CM

## 2025-03-03 LAB
ALBUMIN SERPL BCG-MCNC: 4.6 G/DL (ref 3.5–5.2)
ALBUMIN UR-MCNC: NEGATIVE MG/DL
ALP SERPL-CCNC: 77 U/L (ref 40–150)
ALT SERPL W P-5'-P-CCNC: 16 U/L (ref 0–50)
ANION GAP SERPL CALCULATED.3IONS-SCNC: 11 MMOL/L (ref 7–15)
APPEARANCE UR: CLEAR
AST SERPL W P-5'-P-CCNC: 19 U/L (ref 0–45)
BACTERIAL VAGINOSIS VAG-IMP: NEGATIVE
BASOPHILS # BLD AUTO: 0.1 10E3/UL (ref 0–0.2)
BASOPHILS NFR BLD AUTO: 1 %
BILIRUB SERPL-MCNC: 0.8 MG/DL
BILIRUB UR QL STRIP: NEGATIVE
BUN SERPL-MCNC: 9 MG/DL (ref 6–20)
C TRACH DNA SPEC QL PROBE+SIG AMP: NEGATIVE
CALCIUM SERPL-MCNC: 9.4 MG/DL (ref 8.8–10.4)
CANDIDA DNA VAG QL NAA+PROBE: NOT DETECTED
CANDIDA GLABRATA / CANDIDA KRUSEI DNA: NOT DETECTED
CHLORIDE SERPL-SCNC: 102 MMOL/L (ref 98–107)
COLOR UR AUTO: YELLOW
CREAT SERPL-MCNC: 0.56 MG/DL (ref 0.51–0.95)
EGFRCR SERPLBLD CKD-EPI 2021: >90 ML/MIN/1.73M2
EOSINOPHIL # BLD AUTO: 0.3 10E3/UL (ref 0–0.7)
EOSINOPHIL NFR BLD AUTO: 3 %
ERYTHROCYTE [DISTWIDTH] IN BLOOD BY AUTOMATED COUNT: 12 % (ref 10–15)
GLUCOSE SERPL-MCNC: 105 MG/DL (ref 70–99)
GLUCOSE UR STRIP-MCNC: NEGATIVE MG/DL
HCG UR QL: NEGATIVE
HCO3 SERPL-SCNC: 22 MMOL/L (ref 22–29)
HCT VFR BLD AUTO: 41.7 % (ref 35–47)
HGB BLD-MCNC: 14.8 G/DL (ref 11.7–15.7)
HGB UR QL STRIP: NEGATIVE
HOLD SPECIMEN: NORMAL
IMM GRANULOCYTES # BLD: 0 10E3/UL
IMM GRANULOCYTES NFR BLD: 0 %
KETONES UR STRIP-MCNC: NEGATIVE MG/DL
LEUKOCYTE ESTERASE UR QL STRIP: NEGATIVE
LIPASE SERPL-CCNC: 34 U/L (ref 13–60)
LYMPHOCYTES # BLD AUTO: 1.3 10E3/UL (ref 0.8–5.3)
LYMPHOCYTES NFR BLD AUTO: 12 %
MCH RBC QN AUTO: 31.3 PG (ref 26.5–33)
MCHC RBC AUTO-ENTMCNC: 35.5 G/DL (ref 31.5–36.5)
MCV RBC AUTO: 88 FL (ref 78–100)
MONOCYTES # BLD AUTO: 0.7 10E3/UL (ref 0–1.3)
MONOCYTES NFR BLD AUTO: 7 %
MUCOUS THREADS #/AREA URNS LPF: PRESENT /LPF
N GONORRHOEA DNA SPEC QL NAA+PROBE: NEGATIVE
NEUTROPHILS # BLD AUTO: 8.7 10E3/UL (ref 1.6–8.3)
NEUTROPHILS NFR BLD AUTO: 78 %
NITRATE UR QL: NEGATIVE
NRBC # BLD AUTO: 0 10E3/UL
NRBC BLD AUTO-RTO: 0 /100
PH UR STRIP: 7 [PH] (ref 5–9)
PLATELET # BLD AUTO: 254 10E3/UL (ref 150–450)
POTASSIUM SERPL-SCNC: 4 MMOL/L (ref 3.4–5.3)
PROT SERPL-MCNC: 7.7 G/DL (ref 6.4–8.3)
RBC # BLD AUTO: 4.73 10E6/UL (ref 3.8–5.2)
RBC URINE: 1 /HPF
SODIUM SERPL-SCNC: 135 MMOL/L (ref 135–145)
SP GR UR STRIP: 1.02 (ref 1–1.03)
SPECIMEN TYPE: NORMAL
T VAGINALIS DNA VAG QL NAA+PROBE: NOT DETECTED
UROBILINOGEN UR STRIP-MCNC: NORMAL MG/DL
WBC # BLD AUTO: 11.1 10E3/UL (ref 4–11)
WBC URINE: 1 /HPF

## 2025-03-03 PROCEDURE — 99284 EMERGENCY DEPT VISIT MOD MDM: CPT | Performed by: FAMILY MEDICINE

## 2025-03-03 PROCEDURE — 81003 URINALYSIS AUTO W/O SCOPE: CPT | Performed by: FAMILY MEDICINE

## 2025-03-03 PROCEDURE — 87491 CHLMYD TRACH DNA AMP PROBE: CPT | Performed by: FAMILY MEDICINE

## 2025-03-03 PROCEDURE — 81515 NFCT DS BV&VAGINITIS DNA ALG: CPT | Performed by: FAMILY MEDICINE

## 2025-03-03 PROCEDURE — 96372 THER/PROPH/DIAG INJ SC/IM: CPT | Performed by: FAMILY MEDICINE

## 2025-03-03 PROCEDURE — 36415 COLL VENOUS BLD VENIPUNCTURE: CPT | Performed by: FAMILY MEDICINE

## 2025-03-03 PROCEDURE — 83690 ASSAY OF LIPASE: CPT | Performed by: FAMILY MEDICINE

## 2025-03-03 PROCEDURE — 250N000013 HC RX MED GY IP 250 OP 250 PS 637: Performed by: FAMILY MEDICINE

## 2025-03-03 PROCEDURE — 93976 VASCULAR STUDY: CPT

## 2025-03-03 PROCEDURE — 80051 ELECTROLYTE PANEL: CPT | Performed by: FAMILY MEDICINE

## 2025-03-03 PROCEDURE — 99285 EMERGENCY DEPT VISIT HI MDM: CPT | Mod: 25 | Performed by: FAMILY MEDICINE

## 2025-03-03 PROCEDURE — 81025 URINE PREGNANCY TEST: CPT | Performed by: FAMILY MEDICINE

## 2025-03-03 PROCEDURE — 85004 AUTOMATED DIFF WBC COUNT: CPT | Performed by: FAMILY MEDICINE

## 2025-03-03 PROCEDURE — 82040 ASSAY OF SERUM ALBUMIN: CPT | Performed by: FAMILY MEDICINE

## 2025-03-03 PROCEDURE — 250N000011 HC RX IP 250 OP 636: Performed by: FAMILY MEDICINE

## 2025-03-03 RX ORDER — METRONIDAZOLE 500 MG/1
500 TABLET ORAL 2 TIMES DAILY
Qty: 14 TABLET | Refills: 1 | Status: SHIPPED | OUTPATIENT
Start: 2025-03-03 | End: 2025-03-17

## 2025-03-03 RX ORDER — IBUPROFEN 400 MG/1
400 TABLET, FILM COATED ORAL ONCE
Status: COMPLETED | OUTPATIENT
Start: 2025-03-03 | End: 2025-03-03

## 2025-03-03 RX ORDER — DOXYCYCLINE 100 MG/1
100 CAPSULE ORAL 2 TIMES DAILY
Qty: 28 CAPSULE | Refills: 0 | Status: SHIPPED | OUTPATIENT
Start: 2025-03-03 | End: 2025-03-17

## 2025-03-03 RX ADMIN — IBUPROFEN 400 MG: 400 TABLET, FILM COATED ORAL at 08:11

## 2025-03-03 RX ADMIN — CEFTRIAXONE SODIUM 500 MG: 500 INJECTION, POWDER, FOR SOLUTION INTRAMUSCULAR; INTRAVENOUS at 11:06

## 2025-03-03 ASSESSMENT — COLUMBIA-SUICIDE SEVERITY RATING SCALE - C-SSRS
2. HAVE YOU ACTUALLY HAD ANY THOUGHTS OF KILLING YOURSELF IN THE PAST MONTH?: NO
6. HAVE YOU EVER DONE ANYTHING, STARTED TO DO ANYTHING, OR PREPARED TO DO ANYTHING TO END YOUR LIFE?: NO
1. IN THE PAST MONTH, HAVE YOU WISHED YOU WERE DEAD OR WISHED YOU COULD GO TO SLEEP AND NOT WAKE UP?: NO

## 2025-03-03 ASSESSMENT — ACTIVITIES OF DAILY LIVING (ADL)
ADLS_ACUITY_SCORE: 41

## 2025-03-03 ASSESSMENT — ENCOUNTER SYMPTOMS
ABDOMINAL DISTENTION: 0
VOMITING: 0
RECTAL PAIN: 1
HEMATURIA: 0
DIARRHEA: 0
BLOOD IN STOOL: 0
CHEST TIGHTNESS: 0
BACK PAIN: 0
CHILLS: 0
NAUSEA: 0
DIFFICULTY URINATING: 0
DYSURIA: 0
FEVER: 0

## 2025-03-03 NOTE — ED PROVIDER NOTES
"  History     Chief Complaint   Patient presents with    Abdominal Cramping     HPI  Kathy Hays is a 24 year old female presents to the emergency department with vaginal or rectal cramping on and off for the last 72 hours approximately.  Any Valsalva type maneuver seems to trigger it such as bowel movement or even pushing while urinating.  No urinary frequency or burning with urination.  No bleeding per rectum or vagina, no unusual vaginal discharge.  Theresa did hurt the other day.  Patient is prone to constipation but she does not think that she currently is constipated, seems to be having good bowel movements.  No history of bloody diarrhea, patient does have secondary relatives with Crohn's disease history but no personal history.  No history of kidney stones.  No family history of endometriosis.  new history of PCOS.  Patient has had problems for ovarian cysts in the past but this does not feel like that.  Does not believe she could be pregnant.  Does not believe she has an STI.  Has not felt ill lately, no recent gastroenteritis type symptoms.  This morning she felt like she may be could have vomited due to the pain but otherwise has not had any vomiting illness.  Gets pretty regular periods, her last period was from February 2 to 5.  Has had crampy painful periods in the past that are similar to this but not this painful.  And this is unusual over the weekend because she is not on her period, she does anticipate that coming on perhaps in the next week or so however.  Does not request anything for pain right now other than ibuprofen.  Patient is not currently taking her birth control due to reading the package insert and being concerned about blood clots.    Reviewed RN notes below, similar history relayed to me    \"Pt arrives via private vehicle from home with c/o vaginal and rectal cramping. Pt states cramping started Saturday, any sitting or active BM will cause severe rectal cramping. Pt states " "that vaginal cramping has been increasing since yesterday. Pt states she had intercourse a day ago and the pain was too much. Pt states she is having regular BMs. Pt has been alternating tylenol and ibuprofen throughout the weekend, none today. \"  Allergies:  No Known Allergies    Problem List:    Patient Active Problem List    Diagnosis Date Noted    Acne vulgaris      Priority: Medium    Nevus, non-neoplastic 08/27/2008     Priority: Medium     Overview:   Congenital nevus angle of left jaw, is on carotid artery, so technically difficult to remove. Signed by Lisa Winter MD .....8/16/2016 2:23 PM          Past Medical History:    Past Medical History:   Diagnosis Date    Acne vulgaris     Major depression 2020    Malformation of urachus        Past Surgical History:    Past Surgical History:   Procedure Laterality Date    DILATION AND CURETTAGE SUCTION N/A 6/15/2023    Procedure: DILATION AND CURETTAGE, UTERUS, USING SUCTION;  Surgeon: Sandra Murphy MD;  Location: [facility] OR    INCISION AND DRAINAGE  01/04/2007    INCISION AND DRAINAGE,Excision of a urachus    TONSILLECTOMY, ADENOIDECTOMY, COMBINED  2007       Family History:    Family History   Problem Relation Age of Onset    Depression Mother     Thyroid Disease Father     Cancer Maternal Grandfather         Cancer,throat       Social History:  Marital Status:  Single [1]  Social History     Tobacco Use    Smoking status: Never    Smokeless tobacco: Never   Vaping Use    Vaping status: Former    Start date: 10/1/2017    Quit date: 5/1/2024    Substances: Nicotine, THC    Devices: Disposable   Substance Use Topics    Alcohol use: Yes     Alcohol/week: 6.0 standard drinks of alcohol     Types: 2 Cans of beer, 4 Shots of liquor per week     Comment: 1-2 drinks a week    Drug use: Yes     Types: Marijuana     Comment: Twice a day.        Medications:    doxycycline hyclate (VIBRAMYCIN) 100 MG capsule  metroNIDAZOLE (FLAGYL) 500 MG tablet  norgestimate-ethinyl " "estradiol (ORTHO-CYCLEN) 0.25-35 MG-MCG tablet      Pertinent positives and negatives reviewed below    Review of Systems   Constitutional:  Negative for chills and fever.   Respiratory:  Negative for chest tightness.    Cardiovascular:  Negative for chest pain.   Gastrointestinal:  Positive for rectal pain. Negative for abdominal distention, blood in stool, diarrhea, nausea and vomiting.   Genitourinary:  Positive for pelvic pain. Negative for decreased urine volume, difficulty urinating, dysuria, genital sores, hematuria, menstrual problem, urgency, vaginal bleeding and vaginal discharge.   Musculoskeletal:  Negative for back pain.       Physical Exam   BP: (!) 142/92  Pulse: 94  Temp: 98.3  F (36.8  C)  Resp: 16  Height: 165.1 cm (5' 5\")  Weight: 81.6 kg (180 lb)  SpO2: 99 %      Physical Exam  Vitals and nursing note reviewed. Exam conducted with a chaperone present.   Constitutional:       General: She is not in acute distress.     Appearance: Normal appearance. She is not ill-appearing, toxic-appearing or diaphoretic.   Eyes:      General: No scleral icterus.  Cardiovascular:      Rate and Rhythm: Normal rate and regular rhythm.   Pulmonary:      Effort: Pulmonary effort is normal. No respiratory distress.   Abdominal:      General: There is no distension.      Tenderness: There is abdominal tenderness in the suprapubic area. There is no right CVA tenderness, left CVA tenderness or guarding. Negative signs include Ceballos's sign and McBurney's sign.      Hernia: There is no hernia in the left inguinal area or right inguinal area.   Genitourinary:     General: Normal vulva.      Exam position: Lithotomy position.      Pubic Area: No rash.       Leo stage (genital): 5.      Labia:         Right: No rash, tenderness, lesion or injury.         Left: No rash, tenderness, lesion or injury.       Urethra: No prolapse, urethral pain, urethral swelling or urethral lesion.      Vagina: Normal. No foreign body. No " erythema, tenderness or bleeding.      Cervix: Cervical motion tenderness and erythema present. No friability, lesion or eversion.      Uterus: Normal. Not enlarged and not tender.       Adnexa: Right adnexa normal and left adnexa normal.        Right: No tenderness.          Left: No tenderness.        Rectum: Normal. No mass, tenderness, anal fissure or external hemorrhoid. Normal anal tone.            Comments: Meatus non-tender, some vaginal mucosal discharge and discharge around the cervix and in the posterior fornix.  Patient tolerated pelvic exam very well until speculum touched the cervix, bimanual palpation of the cervix was quite uncomfortable and she did say seem to reproduce her pain.  Rectal exam was not tender, no masses normal reflux no fissure, no stool impaction appreciated.   Lymphadenopathy:      Lower Body: No right inguinal adenopathy. No left inguinal adenopathy.   Neurological:      Mental Status: She is alert.         Results for orders placed or performed during the hospital encounter of 03/03/25 (from the past 24 hours)   UA with Microscopic reflex to Culture    Specimen: Urine, Midstream   Result Value Ref Range    Color Urine Yellow Colorless, Straw, Light Yellow, Yellow    Appearance Urine Clear Clear    Glucose Urine Negative Negative mg/dL    Bilirubin Urine Negative Negative    Ketones Urine Negative Negative mg/dL    Specific Gravity Urine 1.020 1.005 - 1.030    Blood Urine Negative Negative    pH Urine 7.0 5.0 - 9.0    Protein Albumin Urine Negative Negative mg/dL    Urobilinogen Urine Normal 0.2, 1.0, Normal mg/dL    Nitrite Urine Negative Negative    Leukocyte Esterase Urine Negative Negative    Mucus Urine Present (A) None Seen /LPF    RBC Urine 1 <=2 /HPF    WBC Urine 1 <=5 /HPF    Narrative    Urine Culture not indicated   HCG qualitative urine   Result Value Ref Range    hCG Urine Qualitative Negative Negative   CBC with platelets differential    Narrative    The following  orders were created for panel order CBC with platelets differential.  Procedure                               Abnormality         Status                     ---------                               -----------         ------                     CBC with platelets and d...[[id number]]  Abnormal            Final result                 Please view results for these tests on the individual orders.   Lipase   Result Value Ref Range    Lipase 34 13 - 60 U/L   Comprehensive metabolic panel   Result Value Ref Range    Sodium 135 135 - 145 mmol/L    Potassium 4.0 3.4 - 5.3 mmol/L    Carbon Dioxide (CO2) 22 22 - 29 mmol/L    Anion Gap 11 7 - 15 mmol/L    Urea Nitrogen 9.0 6.0 - 20.0 mg/dL    Creatinine 0.56 0.51 - 0.95 mg/dL    GFR Estimate >90 >60 mL/min/1.73m2    Calcium 9.4 8.8 - 10.4 mg/dL    Chloride 102 98 - 107 mmol/L    Glucose 105 (H) 70 - 99 mg/dL    Alkaline Phosphatase 77 40 - 150 U/L    AST 19 0 - 45 U/L    ALT 16 0 - 50 U/L    Protein Total 7.7 6.4 - 8.3 g/dL    Albumin 4.6 3.5 - 5.2 g/dL    Bilirubin Total 0.8 <=1.2 mg/dL   CBC with platelets and differential   Result Value Ref Range    WBC Count 11.1 (H) 4.0 - 11.0 10e3/uL    RBC Count 4.73 3.80 - 5.20 10e6/uL    Hemoglobin 14.8 11.7 - 15.7 g/dL    Hematocrit 41.7 35.0 - 47.0 %    MCV 88 78 - 100 fL    MCH 31.3 26.5 - 33.0 pg    MCHC 35.5 31.5 - 36.5 g/dL    RDW 12.0 10.0 - 15.0 %    Platelet Count 254 150 - 450 10e3/uL    % Neutrophils 78 %    % Lymphocytes 12 %    % Monocytes 7 %    % Eosinophils 3 %    % Basophils 1 %    % Immature Granulocytes 0 %    NRBCs per 100 WBC 0 <1 /100    Absolute Neutrophils 8.7 (H) 1.6 - 8.3 10e3/uL    Absolute Lymphocytes 1.3 0.8 - 5.3 10e3/uL    Absolute Monocytes 0.7 0.0 - 1.3 10e3/uL    Absolute Eosinophils 0.3 0.0 - 0.7 10e3/uL    Absolute Basophils 0.1 0.0 - 0.2 10e3/uL    Absolute Immature Granulocytes 0.0 <=0.4 10e3/uL    Absolute NRBCs 0.0 10e3/uL   Extra Tube    Narrative    The following orders were created for panel  order Extra Tube.  Procedure                               Abnormality         Status                     ---------                               -----------         ------                     Extra Blue Top Tube[[id number]]                              Final result               Extra Red Top Tube[[id number]]                               Final result               Extra Green Top (Lithium...[[id number]]                      Final result                 Please view results for these tests on the individual orders.   Extra Blue Top Tube   Result Value Ref Range    Hold Specimen JIC    Extra Red Top Tube   Result Value Ref Range    Hold Specimen JIC    Extra Green Top (Lithium Heparin) ON ICE   Result Value Ref Range    Hold Specimen JIC    Multiplex Vaginal Panel by PCR    Specimen: Vagina; Swab   Result Value Ref Range    Bacterial Vaginosis Organism DNA Negative Negative    Candida Group DNA Not Detected Not Detected    Candida glabrata / Ene krusei DNA Not Detected Not Detected    Trichomonas vaginalis DNA Not Detected Not Detected    Narrative    The Xpert  Xpress MVP test, performed on the [employer], is an automated, qualitative in vitro diagnostic test for the detection of DNA targets from anaerobic bacteria associated with bacterial vaginosis, Candida species associated with vulvovaginal candidiasis, and Trichomonas vaginalis. The assay uses clinician-collected and self-collected vaginal swabs from patients who are symptomatic for vaginitis/ vaginosis. The Xpert  Xpress MVP test utilizes real-time polymerase chain reaction (PCR) for the amplification of specific DNA targets and utilizes fluorogenic target-specific hybridization probes to detect and differentiate DNA. It is intended to aid in the diagnosis of vaginal infections in women with a clinical presentation consistent with bacterial vaginosis, vulvovaginal candidiasis, or trichomoniasis.   The assay targets three anaerobic  microorgansims that are associated with bacterial vaginosis (BV). Other organisms that are not detected by the Xpert  Xpress MVP test have also been reported to be associated with BV. The BV organism and Candida species targets of the Xpert  Xpress MVP test can be commensal in women; positive results must be considered in conjunction with other clinical and patient information to determine the disease status.   Chlamydia trachomatis/Neisseria gonorrhoeae by PCR    Specimen: Vagina; Swab   Result Value Ref Range    Chlamydia Trachomatis Negative Negative    Neisseria gonorrhoeae Negative Negative    CTNG Specimen Source Vagina     Narrative    Assay performed using [employer] real-time, reverse-transcriptase PCR.   US Pelvis Cmpl wo Transvaginal w Abd/Pel Duplex Lmt    Narrative    PROCEDURE: US PELVIS CMPL WO TRANSVAGINAL W ABD/PEL DUPLEX LIMITED    HISTORY: Pelvic pain; LMP - 2/5/2025    TECHNIQUE: Transabdominal ultrasound of the pelvis.    COMPARISON: Ultrasound 11/5/2024    FINDINGS:     MEASUREMENTS:  Uterus: 8.6 x 5.3 x 3.9 cm  Endometrium: 0.6 cm in thickness  Right Ovary: 3.8 x 3.5 x 3.1 cm  Left Ovary:  3.5 x 3.3 x 1.7 cm    UTERUS: The uterus is normal in size and contour. The endometrium is  normal in thickness. There is no myometrial mass.    ADNEXA: The ovaries are normal in size. There is no adnexal soft  tissue mass. There is normal color flow to the ovaries. Right ovarian  corpus luteal cyst.  DOPPLER: Doppler evaluation shows normal venous and arterial flow to  both ovaries.    MISC: There is a trace physiologic amount of free fluid in the pelvis.      Impression    IMPRESSION: No acute pelvic abnormality. Involuting right corpus  luteal cyst.    DAHLIA BREWER MD         SYSTEM ID:  [id number]       Medications   cefTRIAXone (ROCEPHIN) in NS for IM administration 500 mg (has no administration in time range)   ibuprofen (ADVIL/MOTRIN) tablet 400 mg (400 mg Oral $Given 3/3/25 0811)       Assessments & Plan  (with Medical Decision Making)     I have reviewed the nursing notes.    I have reviewed the findings, diagnosis, plan and need for follow up with the patient.  Serial abdominal exam in the ER, mild suprapubic tenderness, possibly mild left lower quadrant tenderness, right lower quadrant distinctly not tender.    Medical Decision Making  The patient's presentation was of moderate complexity (an undiagnosed new problem with uncertain prognosis).    The patient's evaluation involved:  review of external note(s) from 2 sources (see separate area of note for details)  ordering and/or review of 2 test(s) in this encounter (see separate area of note for details)    The patient's management necessitated moderate risk (prescription drug management including medications given in the ED).    Pelvic pain differential diagnosis is broad and includes sub/ acute causes such as UTI, PID,  ureterolithiasis, constipation, anal fissure, proctitis, colitis, torsion, tubo-ovarian abscess, ruptured cyst, ruptured ectopic pregnancy, STI.    Chronic differential diagnosis would includes entities such as endometriosis, IBS, Crohn's idiopathic pain, interstitial cystitis among others.    Reassuring workup in the ED, reassuring clinical trajectory, after shared decision-making conversation patient does feel like she can go home despite a firm diagnosis not being established.  Will treat empirically for PID given tenderness of cervix that she states is unusual for her with a pelvic exam, new dyspareunia and clinical context.  STI screening is negative, moreover patient is low risk with having had only 1 partner.  She was educated that there are other like sexually-transmitted causes of PID.  Empiric treatment to see if she improves with antibiotics and NSAIDs and follow-up with OB.  Patient identifies Dr. Ybarra has her OB/Gyn.  Ultrasound is reassuring against acute etiology such as torsion, TOA and large hemorrhagic cyst.  She does have  trace physiologic fluid in her lower abdomen which certainly does not rule out a recently ruptured cyst as being the cause of her symptoms but it seems less likely.  Discussed risk and benefit of a CT scan, given workup, reassuring trajectory absence of localized peritonitis on exam and absence of fever after shared decision-making conversation we agreed that currently risk of CT of the pelvis exceeds benefit.  We discussed however that could change with the development of fever, localizing abdominal pain tenderness or uncontrolled pain.  Patient will return to ED with worsening symptoms.  Avoid too much sun exposure on doxycycline, counseled on nausea as possible side effect.  Counseled on alcohol avoidance while on Flagyl.    New Prescriptions    DOXYCYCLINE HYCLATE (VIBRAMYCIN) 100 MG CAPSULE    Take 1 capsule (100 mg) by mouth 2 times daily for 14 days.    METRONIDAZOLE (FLAGYL) 500 MG TABLET    Take 1 tablet (500 mg) by mouth 2 times daily for 14 days.       Final diagnoses:   Pelvic pain in female       3/3/2025   [facility]  03/03/25 3110

## 2025-03-03 NOTE — ED TRIAGE NOTES
Pt arrives via private vehicle from home with c/o vaginal and rectal cramping. Pt states cramping started Saturday, any sitting or active BM will cause severe rectal cramping. Pt states that vaginal cramping has been increasing since yesterday. Pt states she had intercourse a day ago and the pain was too much. Pt states she is having regular BMs. Pt has been alternating tylenol and ibuprofen throughout the weekend, none today.      Triage Assessment (Adult)       Row Name 03/03/25 0721          Triage Assessment    Airway WDL WDL        Respiratory WDL    Respiratory WDL WDL        Skin Circulation/Temperature WDL    Skin Circulation/Temperature WDL WDL        Cardiac WDL    Cardiac WDL WDL        Peripheral/Neurovascular WDL    Peripheral Neurovascular WDL WDL        Cognitive/Neuro/Behavioral WDL    Cognitive/Neuro/Behavioral WDL WDL

## 2025-03-03 NOTE — Clinical Note
Kathy Hays was seen and treated in our emergency department on 3/3/2025.  She may return to work on 03/05/2025.       If you have any questions or concerns, please don't hesitate to call.      Annabelle Mendes RN

## 2025-03-09 ENCOUNTER — HEALTH MAINTENANCE LETTER (OUTPATIENT)
Age: 25
End: 2025-03-09

## (undated) DEVICE — TUBING SUCTION BOTTLE ASSEMBLY DISP 20714

## (undated) DEVICE — GLOVE PROTEXIS POWDER FREE SMT 6.5  2D72PT65X

## (undated) DEVICE — PAD PERI INDIV WRAP 11" 2022A

## (undated) DEVICE — TUBING VACUUM COLLECTION 6FT 23116

## (undated) DEVICE — Device

## (undated) DEVICE — NDL SPINAL 20GA 2.5"

## (undated) DEVICE — STRAP STIRRUP W/O SLIP 30187-020

## (undated) DEVICE — DRSG TELFA 3X8" 1238

## (undated) DEVICE — PANTIES MESH LG/XLG 2PK 706M2

## (undated) DEVICE — SLEEVE COMPRESSION SCD KNEE MED 74022

## (undated) DEVICE — SYR 10ML FINGER CONTROL W/O NDL 309695

## (undated) DEVICE — SPECIMEN TRAP VACUUM SUCTION 003984-901

## (undated) DEVICE — SOL WATER 1500ML

## (undated) DEVICE — PAD CHUX UNDERPAD 30X36" P3036C

## (undated) DEVICE — DECANTER VIAL 2006S

## (undated) DEVICE — PACK LITHOTOMY SBA15LIFCA

## (undated) RX ORDER — NITROFURANTOIN 25; 75 MG/1; MG/1
CAPSULE ORAL
Status: DISPENSED
Start: 2023-05-15

## (undated) RX ORDER — ONDANSETRON 2 MG/ML
INJECTION INTRAMUSCULAR; INTRAVENOUS
Status: DISPENSED
Start: 2023-06-15

## (undated) RX ORDER — ONDANSETRON 2 MG/ML
INJECTION INTRAMUSCULAR; INTRAVENOUS
Status: DISPENSED
Start: 2022-12-28

## (undated) RX ORDER — LIDOCAINE HYDROCHLORIDE 10 MG/ML
INJECTION, SOLUTION EPIDURAL; INFILTRATION; INTRACAUDAL; PERINEURAL
Status: DISPENSED
Start: 2025-03-03

## (undated) RX ORDER — IBUPROFEN 200 MG
TABLET ORAL
Status: DISPENSED
Start: 2023-06-18

## (undated) RX ORDER — CEFTRIAXONE 500 MG/1
INJECTION, POWDER, FOR SOLUTION INTRAMUSCULAR; INTRAVENOUS
Status: DISPENSED
Start: 2025-03-03

## (undated) RX ORDER — KETOROLAC TROMETHAMINE 30 MG/ML
INJECTION, SOLUTION INTRAMUSCULAR; INTRAVENOUS
Status: DISPENSED
Start: 2022-11-12

## (undated) RX ORDER — PROPOFOL 10 MG/ML
INJECTION, EMULSION INTRAVENOUS
Status: DISPENSED
Start: 2023-06-15

## (undated) RX ORDER — KETOROLAC TROMETHAMINE 15 MG/ML
INJECTION, SOLUTION INTRAMUSCULAR; INTRAVENOUS
Status: DISPENSED
Start: 2021-11-03

## (undated) RX ORDER — FENTANYL CITRATE 50 UG/ML
INJECTION, SOLUTION INTRAMUSCULAR; INTRAVENOUS
Status: DISPENSED
Start: 2023-06-15

## (undated) RX ORDER — IBUPROFEN 400 MG/1
TABLET, FILM COATED ORAL
Status: DISPENSED
Start: 2025-03-03

## (undated) RX ORDER — DOXYCYCLINE 100 MG/10ML
INJECTION, POWDER, LYOPHILIZED, FOR SOLUTION INTRAVENOUS
Status: DISPENSED
Start: 2023-06-15

## (undated) RX ORDER — ONDANSETRON 4 MG/1
TABLET, ORALLY DISINTEGRATING ORAL
Status: DISPENSED
Start: 2022-12-28

## (undated) RX ORDER — IBUPROFEN 400 MG/1
TABLET, FILM COATED ORAL
Status: DISPENSED
Start: 2023-06-18

## (undated) RX ORDER — CEFTRIAXONE SODIUM 1 G
VIAL (EA) INJECTION
Status: DISPENSED
Start: 2025-03-03

## (undated) RX ORDER — BUPIVACAINE HYDROCHLORIDE 2.5 MG/ML
INJECTION, SOLUTION EPIDURAL; INFILTRATION; INTRACAUDAL
Status: DISPENSED
Start: 2023-06-15